# Patient Record
Sex: FEMALE | Race: WHITE | NOT HISPANIC OR LATINO | Employment: STUDENT | URBAN - METROPOLITAN AREA
[De-identification: names, ages, dates, MRNs, and addresses within clinical notes are randomized per-mention and may not be internally consistent; named-entity substitution may affect disease eponyms.]

---
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
## 2019-02-28 DIAGNOSIS — Q90.9 DOWN SYNDROME: Primary | ICD-10-CM

## 2019-04-04 ENCOUNTER — OFFICE VISIT (OUTPATIENT)
Dept: FAMILY MEDICINE CLINIC | Facility: CLINIC | Age: 19
End: 2019-04-04

## 2019-04-04 VITALS
BODY MASS INDEX: 27.77 KG/M2 | SYSTOLIC BLOOD PRESSURE: 98 MMHG | DIASTOLIC BLOOD PRESSURE: 72 MMHG | HEIGHT: 55 IN | HEART RATE: 68 BPM | OXYGEN SATURATION: 98 % | WEIGHT: 120 LBS

## 2019-04-04 DIAGNOSIS — G47.30 SLEEP APNEA, UNSPECIFIED TYPE: ICD-10-CM

## 2019-04-04 DIAGNOSIS — E03.1 CONGENITAL HYPOTHYROIDISM WITHOUT GOITER: ICD-10-CM

## 2019-04-04 DIAGNOSIS — Z87.74 H/O CONGENITAL ATRIAL SEPTAL DEFECT (ASD) REPAIR: ICD-10-CM

## 2019-04-04 DIAGNOSIS — Z00.00 PHYSICAL EXAM: Primary | ICD-10-CM

## 2019-04-04 DIAGNOSIS — Q90.9 DOWN SYNDROME: ICD-10-CM

## 2019-04-04 PROCEDURE — 99395 PREV VISIT EST AGE 18-39: CPT | Performed by: NURSE PRACTITIONER

## 2019-04-04 PROCEDURE — 99212 OFFICE O/P EST SF 10 MIN: CPT | Performed by: NURSE PRACTITIONER

## 2019-04-04 RX ORDER — ASCORBIC ACID 500 MG
1000 TABLET ORAL DAILY
COMMUNITY

## 2019-04-04 RX ORDER — CHLORAL HYDRATE 500 MG
CAPSULE ORAL
COMMUNITY
End: 2020-05-19

## 2019-04-04 NOTE — PROGRESS NOTES
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 She states she is doing well and has no concerns  PMHX: Down syndrome is followed by ortho, cards, endo.,ent,and gyn. Keeps up with appointment mother is very involved in her care.  Recent weight loss of 6 lbs and is going to be joining the gym.      REVIEW OF SYSTEMS  Constitutional: Negative.    HENT: Negative.    Eyes: Negative.    Respiratory: Negative.    Cardiovascular: Negative.    Gastrointestinal: Negative.    Endocrine: Negative.    Genitourinary: Negative.    Musculoskeletal: Negative.  Skin: Negative.    Allergic/Immunologic: Negative.    Neurological: Negative.    Hematological: Negative.    Psychiatric/Behavioral: Negative.    All other systems reviewed and are negative.          PHYSICAL EXAM    Vitals:    04/04/19 1333   BP: 98/72   Pulse: 68   SpO2: 98%   Weight: 54.4 kg (120 lb)   Height: 138.4 cm (54.5\")     GENERAL: alert and oriented, afebrile and vital signs stable  HEENT: oral mucosa moist, PEERLA, EOM, conjunctiva normal  No cervical adenopathy  LUNGS: clear to ascultation bilaterally, no rales, ronchi or wheezing  HEART: RRR S1 S2 without murmers, thrills, rubs or gallops  CHEST WALL: within normal limits, no tenderness  BREAST EXAM: No masses, skin changes, tenderness or discharge noted  ABDOMEN: WNL. Normal BS.  EXTREMITIES: No clubbing, cyanosis or edema noted. Normal Pulses.  SKIN: warm, dry, no rashes noted  NEURO: CN II- XII grossly intact    ASSESSMENT AND PLAN  Problem List Items Addressed This Visit        Other    Down syndrome      Other Visit Diagnoses     Physical exam    -  Primary    Congenital hypothyroidism without goiter        H/O congenital atrial septal defect (ASD) repair        Sleep apnea, unspecified type          I am deferring labs at this time she is scheduled to have labs with Dr" Du in a couple of weeks and he will send us her results.  Routine health maintenance reviewed and discussed with Chantale.    .No orders of the defined types were placed in this encounter.    No Follow-up on file.

## 2019-04-04 NOTE — PATIENT INSTRUCTIONS
Preventive Care 18-39 Years, Female  Preventive care refers to lifestyle choices and visits with your health care provider that can promote health and wellness.  What does preventive care include?  · A yearly physical exam. This is also called an annual well check.  · Dental exams once or twice a year.  · Routine eye exams. Ask your health care provider how often you should have your eyes checked.  · Personal lifestyle choices, including:  ? Daily care of your teeth and gums.  ? Regular physical activity.  ? Eating a healthy diet.  ? Avoiding tobacco and drug use.  ? Limiting alcohol use.  ? Practicing safe sex.  ? Taking vitamin and mineral supplements as recommended by your health care provider.  What happens during an annual well check?  The services and screenings done by your health care provider during your annual well check will depend on your age, overall health, lifestyle risk factors, and family history of disease.  Counseling  Your health care provider may ask you questions about your:  · Alcohol use.  · Tobacco use.  · Drug use.  · Emotional well-being.  · Home and relationship well-being.  · Sexual activity.  · Eating habits.  · Work and work environment.  · Method of birth control.  · Menstrual cycle.  · Pregnancy history.    Screening  You may have the following tests or measurements:  · Height, weight, and BMI.  · Diabetes screening. This is done by checking your blood sugar (glucose) after you have not eaten for a while (fasting).  · Blood pressure.  · Lipid and cholesterol levels. These may be checked every 5 years starting at age 20.  · Skin check.  · Hepatitis C blood test.  · Hepatitis B blood test.  · Sexually transmitted disease (STD) testing.  · BRCA-related cancer screening. This may be done if you have a family history of breast, ovarian, tubal, or peritoneal cancers.  · Pelvic exam and Pap test. This may be done every 3 years starting at age 21. Starting at age 30, this may be done every 5  years if you have a Pap test in combination with an HPV test.    Discuss your test results, treatment options, and if necessary, the need for more tests with your health care provider.  Vaccines  Your health care provider may recommend certain vaccines, such as:  · Influenza vaccine. This is recommended every year.  · Tetanus, diphtheria, and acellular pertussis (Tdap, Td) vaccine. You may need a Td booster every 10 years.  · Varicella vaccine. You may need this if you have not been vaccinated.  · HPV vaccine. If you are 26 or younger, you may need three doses over 6 months.  · Measles, mumps, and rubella (MMR) vaccine. You may need at least one dose of MMR. You may also need a second dose.  · Pneumococcal 13-valent conjugate (PCV13) vaccine. You may need this if you have certain conditions and were not previously vaccinated.  · Pneumococcal polysaccharide (PPSV23) vaccine. You may need one or two doses if you smoke cigarettes or if you have certain conditions.  · Meningococcal vaccine. One dose is recommended if you are age 19-21 years and a first-year college student living in a residence [place], or if you have one of several medical conditions. You may also need additional booster doses.  · Hepatitis A vaccine. You may need this if you have certain conditions or if you travel or work in places where you may be exposed to hepatitis A.  · Hepatitis B vaccine. You may need this if you have certain conditions or if you travel or work in places where you may be exposed to hepatitis B.  · Haemophilus influenzae type b (Hib) vaccine. You may need this if you have certain risk factors.    Talk to your health care provider about which screenings and vaccines you need and how often you need them.  This information is not intended to replace advice given to you by your health care provider. Make sure you discuss any questions you have with your health care provider.  Document Released: 02/13/2003 Document Revised: 07/31/2018  Document Reviewed: 10/18/2016  [employer] Interactive Patient Education © 2019 Elsevier Inc.

## 2019-08-07 ENCOUNTER — OFFICE VISIT (OUTPATIENT)
Dept: FAMILY MEDICINE CLINIC | Facility: CLINIC | Age: 19
End: 2019-08-07

## 2019-08-07 VITALS
DIASTOLIC BLOOD PRESSURE: 72 MMHG | RESPIRATION RATE: 16 BRPM | WEIGHT: 122 LBS | BODY MASS INDEX: 28.23 KG/M2 | HEIGHT: 55 IN | SYSTOLIC BLOOD PRESSURE: 96 MMHG | OXYGEN SATURATION: 99 % | HEART RATE: 77 BPM

## 2019-08-07 DIAGNOSIS — L01.00 IMPETIGO: Primary | ICD-10-CM

## 2019-08-07 PROCEDURE — 99213 OFFICE O/P EST LOW 20 MIN: CPT | Performed by: NURSE PRACTITIONER

## 2019-08-07 RX ORDER — CETIRIZINE HYDROCHLORIDE 10 MG/1
10 TABLET ORAL DAILY
Refills: 0 | COMMUNITY
Start: 2019-07-15 | End: 2020-06-01 | Stop reason: SDUPTHER

## 2019-08-07 RX ORDER — MONTELUKAST SODIUM 10 MG/1
10 TABLET ORAL DAILY
Refills: 11 | COMMUNITY
Start: 2019-07-07

## 2019-08-07 RX ORDER — OLOPATADINE HYDROCHLORIDE 2 MG/ML
1 SOLUTION/ DROPS OPHTHALMIC DAILY
COMMUNITY
End: 2021-05-20

## 2019-08-07 RX ORDER — PIMECROLIMUS 10 MG/G
CREAM TOPICAL 2 TIMES DAILY
COMMUNITY
End: 2021-05-20

## 2019-08-07 RX ORDER — MUPIROCIN CALCIUM 20 MG/G
CREAM TOPICAL 3 TIMES DAILY
Qty: 15 G | Refills: 0 | Status: SHIPPED | OUTPATIENT
Start: 2019-08-07 | End: 2020-05-19

## 2019-08-07 RX ORDER — POLYETHYLENE GLYCOL 3350 17 G/DOSE
POWDER (GRAM) ORAL
Refills: 5 | COMMUNITY
Start: 2019-07-09 | End: 2021-12-08 | Stop reason: SDUPTHER

## 2019-08-07 RX ORDER — MELATONIN 5 MG
5 TABLET,CHEWABLE ORAL DAILY
COMMUNITY
End: 2021-05-20

## 2019-08-07 NOTE — PROGRESS NOTES
Subjective   Chantale Reynolds is a 19 y.o. female.     History of Present Illness   Pt is here for rash. Sx include rash in both forearms, specially in R arm. Pt states Sx have been present for about 2 months. Pt says has used hydrocortisone and  Elidel  OTC                                     The following portions of the patient's history were reviewed and updated as appropriate: allergies, current medications, past family history, past medical history, past social history, past surgical history and problem list.    Review of Systems   Constitutional: Negative for activity change, appetite change and fever.   Respiratory: Negative for cough and shortness of breath.    Skin: Positive for rash.       Objective   Physical Exam   Constitutional: She appears well-developed and well-nourished. No distress.   HENT:   Head: Normocephalic and atraumatic.   Eyes: EOM are normal.   Neck: Neck supple. No thyromegaly present.   Musculoskeletal: Normal range of motion.   Neurological: She is alert.   Skin: Skin is warm. Rash noted.   Diffuse macular papular lesions to rt forearm nonvesicular.   Nursing note and vitals reviewed.      Assessment/Plan   Chantale was seen today for rash.    Diagnoses and all orders for this visit:    Impetigo    Other orders  -     mupirocin (BACTROBAN) 2 % cream; Apply  topically to the appropriate area as directed 3 (Three) Times a Day.      If no improvement with the Bactroban they are going to f/u with her dermatologist Dr Blank

## 2019-08-07 NOTE — PATIENT INSTRUCTIONS
Rash, Adult  A rash is a change in the color of the skin. A rash can also change the way your skin feels. There are many different conditions and factors that can cause a rash. Some rashes may disappear after a few days, but some may last for a couple of weeks. Common causes of rashes include:  · Viral infections, such as:  ? Colds.  ? Measles.  ? Hand, foot, and mouth disease.  · Bacterial infections, such as:  ? Scarlet fever.  ? Impetigo.  · Fungal infections, such as Candida.  · Allergic reactions to food, medicines, or skin care products.  Follow these instructions at home:  The goal of treatment is to stop the itching and keep the rash from spreading. Pay attention to any changes in your symptoms. Follow these instructions to help with your condition:  Medicine  Take or apply over-the-counter and prescription medicines only as told by your health care provider. These may include:  · Corticosteroid cream.  · Anti-itch lotions.  · Oral antihistamines.  Skin Care  · Apply cool compresses to the affected areas.  · Try taking a bath with:  ? Epsom salts. Follow the instructions on the packaging. You can get these at your local pharmacy or grocery store.  ? Baking soda. Pour a small amount into the bath as told by your health care provider.  ? Colloidal oatmeal. Follow the instructions on the packaging. You can get this at your local pharmacy or grocery store.  · Try applying baking soda paste to your skin. Stir water into baking soda until it reaches a paste-like consistency.  · Do not scratch or rub your skin.  · Avoid covering the rash. Make sure the rash is exposed to air as much as possible.  General instructions  · Avoid hot showers or baths, which can make itching worse. A cold shower may help.  · Avoid scented soaps, detergents, and perfumes. Use gentle soaps, detergents, perfumes, and other cosmetic products.  · Avoid any substance that causes your rash. Keep a journal to help track what causes your rash.  "Write down:  ? What you eat.  ? What cosmetic products you use.  ? What you drink.  ? What you wear. This includes jewelry.  · Keep all follow-up visits as told by your health care provider. This is important.  Contact a health care provider if:  · You sweat at night.  · You lose weight.  · You urinate more than normal.  · You feel weak.  · You vomit.  · Your skin or the whites of your eyes look yellow (jaundice).  · Your skin:  ? Tingles.  ? Is numb.  · Your rash:  ? Does not go away after several days.  ? Gets worse.  · You are:  ? Unusually thirsty.  ? More tired than normal.  · You have:  ? New symptoms.  ? Pain in your abdomen.  ? A fever.  ? Diarrhea.  Get help right away if:  · You develop a rash that covers all or most of your body. The rash may or may not be painful.  · You develop blisters that:  ? Are on top of the rash.  ? Grow larger or grow together.  ? Are painful.  ? Are inside your nose or mouth.  · You develop a rash that:  ? Looks like purple pinprick-sized spots all over your body.  ? Has a \"bull's eye\" or looks like a target.  ? Is not related to sun exposure, is red and painful, and causes your skin to peel.  Summary  · Some rashes disappear after a few days, but some may last for a couple of weeks.  · The goal of treatment is to stop the itching and keep the rash from spreading.  · Take or apply over-the-counter and prescription medicines only as told by your health care provider.  This information is not intended to replace advice given to you by your health care provider. Make sure you discuss any questions you have with your health care provider.  Document Released: 12/08/2003 Document Revised: 01/08/2019 Document Reviewed: 05/04/2016  Elsevier Interactive Patient Education © 2019 Else[employer] Inc.    "

## 2019-09-24 ENCOUNTER — OFFICE VISIT (OUTPATIENT)
Dept: OBSTETRICS AND GYNECOLOGY | Age: 19
End: 2019-09-24

## 2019-09-24 VITALS
WEIGHT: 123 LBS | SYSTOLIC BLOOD PRESSURE: 120 MMHG | HEIGHT: 55 IN | BODY MASS INDEX: 28.46 KG/M2 | DIASTOLIC BLOOD PRESSURE: 68 MMHG

## 2019-09-24 DIAGNOSIS — N89.6 INTACT HYMENAL RING: ICD-10-CM

## 2019-09-24 DIAGNOSIS — Z00.00 ENCOUNTER FOR ANNUAL PHYSICAL EXAM: Primary | ICD-10-CM

## 2019-09-24 PROCEDURE — 99395 PREV VISIT EST AGE 18-39: CPT | Performed by: OBSTETRICS & GYNECOLOGY

## 2019-09-24 NOTE — PROGRESS NOTES
Subjective     Chief Complaint   Patient presents with   • Gynecologic Exam     Annual         History of Present Illness      Chanatle Reynolds is a very pleasant  19 y.o. female who presents for annual exam.  Mammo Exam none, Contraception patient's had a hysterectomy, Exercise patient is walking with her mom 7 times a week    Patient underwent a hysterectomy.  She and her mother are very happy they have had that.  IVC she is had no bleeding no dysmenorrhea.    She was known to have an intact hymen , and we have discussed how important it is for the family to be on alert for sexual predators given patient's history of Down syndrome..    Patient occasionally will have some gastrointestinal symptoms that she is addressing with her primary care physician but does not have any gynecological concerns or complaints.    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          Obstetric History:  OB History     No data available         Menstrual History:     Patient's last menstrual period was 05/19/2016.       Sexual History:       Past Medical History:   Diagnosis Date   • Acid reflux    • ASD (atrial septal defect)    • Asthma     MILD   • AVD (aortic valve disease)    • Congestive heart failure (CMS/HCC)    • Down syndrome    • Dysmenorrhea    • Irregular heart beat    • Knee pain     PAULY   • Pain in both feet    • Sleep apnea     WEARS CPAP   • Thyroid disease    • Urinary incontinence    • Uses hearing aid      Past Surgical History:   Procedure Laterality Date   • CARDIAC VALVE SURGERY     • EAR GRAFT BONE Left    • HYSTERECTOMY SUPRACERVICAL N/A 6/1/2016    Procedure: LAPAROSCOPIC ASSISTED SUPRACERVICAL HYSTERECTOMY WITH BILATERAL SALPINGECTOMY;  Surgeon: Howie Faria MD;  [place] [facility] [place] [facility] [place]  Service:    • TONSILLECTOMY     • TONSILLECTOMY AND ADENOIDECTOMY     • TYMPANOPLASTY W/ MASTOIDECTOMY      X3       SOCIAL  "Hx:      The following portions of the patient's history were reviewed and updated as appropriate: allergies, current medications, past family history, past medical history, past social history, past surgical history and problem list.    Review of Systems        Except as outlined in history of physical illness, patient denies any changes in her GYN, , GI systems.  All other systems reviewed were negative.         Objective   Physical Exam    /68   Ht 138.4 cm (54.5\")   Wt 55.8 kg (123 lb)   LMP 05/19/2016   BMI 29.11 kg/m²     General: Patient is alert and oriented and appears overall healthy  Neck: Is supple without thyromegaly, no carotid bruits and no lymphadenopathy  Lungs: Clear bilaterally, no wheezing, rhonchi, or rales.  Respiratory rate is normal  Breast: Even, symmetrical, no lymphadenopathy, no retraction, no masses or cysts  Heart: Regular rate and rhythm are appreciated, no murmurs or rubs are heard  Abdomen: Is soft, without organomegaly, bowel sounds are positive, there is no                                rebound or guarding and palpation does not produce any discomfort  Back: Nontender without CVA tenderness  Pelvic: External genitalia appear normal and consistent with mature female.  BUS normal                            Vagina is clean dry without discharge and appears adequately estrogenized, no               lesions or masses are present                 Hymen is mostly intact.  Unchanged from previous exam                         Cervix is noninflamed without discharge or lesions.  There is no cervical motion             tenderness.                             Adnexa are non-enlarged, non tender               Rectal exam reveals adequate sphincter tone and no masses or lesions are                     appreciated on digital rectal examination.      Annual Well Woman Exam  Patient Active Problem List   Diagnosis   • Down syndrome   • Injury of index finger   • Knee pain   • ENRIQUE " (obstructive sleep apnea)   • Pain in joint involving pelvic region and thigh   • Patellar subluxation   • Patellofemoral pain syndrome of right knee   • Pes planovalgus   • Intact hymenal ring                 Assessment/Plan   Chantale was seen today for gynecologic exam.    Diagnoses and all orders for this visit:    Encounter for annual physical exam    Intact hymenal ring    It does not appear that there has been any type of sexual activity with intact hymenal ring.  Bimanual exam is normal.  Patient's had a supracervical hysterectomy.  She seems to be doing well hormonally.  Interestingly it does appear that she has a little more breast development than our previous examination.  Mom concurs that she thinks that she has had some hormonal development in the last year.  This would coincide with delayed physiologic development in a patient with Down syndrome.  There is no apparent reason to perform a Pap smear at this stage.  Patient will return in 12 months for regular visit and I have asked mom to call if any of her conditions change.        Discussed today's findings and concerns with patient.  Continue to recommend regular exercise including cardiovascular and resistance training as well as  breast self-exam. Wellness lab, mammography, & pap smear, in accordance with age guidelines.        All of the patient's questions were addressed and answered, I have encouraged her to call for today's test results if she has not received them within 10 days.  Patient is advised to call with any change in her condition or with any other questions, otherwise return in 12 months for annual examination.

## 2019-11-19 ENCOUNTER — TELEPHONE (OUTPATIENT)
Dept: FAMILY MEDICINE CLINIC | Facility: CLINIC | Age: 19
End: 2019-11-19

## 2019-11-19 NOTE — TELEPHONE ENCOUNTER
Patient's mother Micki called, she would like a call back regarding Chantale's labs that were completed at [facility] - Dr. Sandy on 9/30/2019. She stated she would like Deepti's opinion regarding the lab results and possibly retesting the liver labs. For the labs, she would like those to be completed at the [facility].   Please call Micki at [phone]. Per Micki's request- please do not schedule anything without speaking to her.    Thank you.

## 2019-11-20 NOTE — TELEPHONE ENCOUNTER
Norma,  Can you do me a favor and call pediatric endocrine and have them fax her last visit and her labs faxed to us please and thank you.

## 2019-11-26 ENCOUNTER — RESULTS ENCOUNTER (OUTPATIENT)
Dept: FAMILY MEDICINE CLINIC | Facility: CLINIC | Age: 19
End: 2019-11-26

## 2019-11-26 ENCOUNTER — TELEPHONE (OUTPATIENT)
Dept: FAMILY MEDICINE CLINIC | Facility: CLINIC | Age: 19
End: 2019-11-26

## 2019-11-26 DIAGNOSIS — R79.89 ELEVATED LFTS: ICD-10-CM

## 2019-11-26 DIAGNOSIS — R79.89 ELEVATED LFTS: Primary | ICD-10-CM

## 2019-11-26 NOTE — TELEPHONE ENCOUNTER
Spoke with mom about Chantale's recent lab work, that showed she had elevated liver enzymes.  These labs were done by Dr. Nela Estrada a pediatric endocrinologist he reports he thinks that this is related to steatohepatitis and I agree.  In essence I think she has nonalcoholic fatty liver disease.  We are going to repeat labs in mother is requesting that we refer her to GI which we will.

## 2019-11-27 ENCOUNTER — RESULTS ENCOUNTER (OUTPATIENT)
Dept: FAMILY MEDICINE CLINIC | Facility: CLINIC | Age: 19
End: 2019-11-27

## 2019-11-27 DIAGNOSIS — R79.89 ELEVATED LFTS: ICD-10-CM

## 2020-03-23 ENCOUNTER — TELEPHONE (OUTPATIENT)
Dept: FAMILY MEDICINE CLINIC | Facility: CLINIC | Age: 20
End: 2020-03-23

## 2020-03-23 NOTE — TELEPHONE ENCOUNTER
PATIENT'S MOTHER CALLED AND THE PATIENT  HAS BEEN DIAGNOSED WITH A FLUID FILLED CYST ON HER SPINE. SHE HASN'T BEEN ABLE T GET ANYONE TO EXPLAIN TO HER ON HOW TO PROCEED AND WHAT SORT OF ADJUSTMENTS MIKEL SHOULD MAKE.     SHE WOULD LIKE TO KNOW IF MANDO CAN OFFER ANY GUIDANCE OR HELP HER FIGURE OUT WHAT IS GOING ON.     [facility] IS WHERE SHE WAS SEEN.   W/ LEYLA RFEGOSO NP    PATIENT CALLBACK [phone]

## 2020-03-23 NOTE — TELEPHONE ENCOUNTER
PATIENT WENT TO HER ENDOCRINOLOGIST TODAY AND SHE HAD A LOT OF BLOOD WORK ON HER. AND SHE WOULD LIKE MANDO ALEJO TO HAVE THE RESULTS.     CMP   CBC   A1C  LIPID PANEL   FREE T4  TSH    SHE IS ALSO BEEN SEEN BY HER EAR DOCTOR AND HER ORTHO SURGEON.      PATIENT CALLBACK [phone]

## 2020-03-24 NOTE — TELEPHONE ENCOUNTER
Mother called, patient had been seen at [facility] and had a lumbar MRI.  She was called by the center and told that the patient had a fluid-filled cyst on her lumbar spine with no other plan.  Mother has called the office several times and was called back because [facility] by Dr. High she did not note she is supposed to follow-up and send it to Lily Cantrell the nurse practitioner that is in charge of the case currently with no asked Lily to call the mother back.

## 2020-05-19 ENCOUNTER — OFFICE VISIT (OUTPATIENT)
Dept: FAMILY MEDICINE CLINIC | Facility: CLINIC | Age: 20
End: 2020-05-19

## 2020-05-19 VITALS
HEIGHT: 55 IN | SYSTOLIC BLOOD PRESSURE: 120 MMHG | BODY MASS INDEX: 28.05 KG/M2 | TEMPERATURE: 98.3 F | DIASTOLIC BLOOD PRESSURE: 60 MMHG | WEIGHT: 121.2 LBS

## 2020-05-19 DIAGNOSIS — Z00.00 PHYSICAL EXAM: Primary | ICD-10-CM

## 2020-05-19 DIAGNOSIS — Q90.9 DOWN SYNDROME: ICD-10-CM

## 2020-05-19 PROCEDURE — 99212 OFFICE O/P EST SF 10 MIN: CPT | Performed by: NURSE PRACTITIONER

## 2020-05-19 PROCEDURE — 99395 PREV VISIT EST AGE 18-39: CPT | Performed by: NURSE PRACTITIONER

## 2020-05-19 NOTE — PATIENT INSTRUCTIONS
Preventive Care 21-39 Years Old, Female  Preventive care refers to visits with your health care provider and lifestyle choices that can promote health and wellness. This includes:  · A yearly physical exam. This may also be called an annual well check.  · Regular dental visits and eye exams.  · Immunizations.  · Screening for certain conditions.  · Healthy lifestyle choices, such as eating a healthy diet, getting regular exercise, not using drugs or products that contain nicotine and tobacco, and limiting alcohol use.  What can I expect for my preventive care visit?  Physical exam  Your health care provider will check your:  · Height and weight. This may be used to calculate body mass index (BMI), which tells if you are at a healthy weight.  · Heart rate and blood pressure.  · Skin for abnormal spots.  Counseling  Your health care provider may ask you questions about your:  · Alcohol, tobacco, and drug use.  · Emotional well-being.  · Home and relationship well-being.  · Sexual activity.  · Eating habits.  · Work and work environment.  · Method of birth control.  · Menstrual cycle.  · Pregnancy history.  What immunizations do I need?    Influenza (flu) vaccine  · This is recommended every year.  Tetanus, diphtheria, and pertussis (Tdap) vaccine  · You may need a Td booster every 10 years.  Varicella (chickenpox) vaccine  · You may need this if you have not been vaccinated.  Human papillomavirus (HPV) vaccine  · If recommended by your health care provider, you may need three doses over 6 months.  Measles, mumps, and rubella (MMR) vaccine  · You may need at least one dose of MMR. You may also need a second dose.  Meningococcal conjugate (MenACWY) vaccine  · One dose is recommended if you are age 19-21 years and a first-year college student living in a residence [place], or if you have one of several medical conditions. You may also need additional booster doses.  Pneumococcal conjugate (PCV13) vaccine  · You may need  this if you have certain conditions and were not previously vaccinated.  Pneumococcal polysaccharide (PPSV23) vaccine  · You may need one or two doses if you smoke cigarettes or if you have certain conditions.  Hepatitis A vaccine  · You may need this if you have certain conditions or if you travel or work in places where you may be exposed to hepatitis A.  Hepatitis B vaccine  · You may need this if you have certain conditions or if you travel or work in places where you may be exposed to hepatitis B.  Haemophilus influenzae type b (Hib) vaccine  · You may need this if you have certain conditions.  You may receive vaccines as individual doses or as more than one vaccine together in one shot (combination vaccines). Talk with your health care provider about the risks and benefits of combination vaccines.  What tests do I need?    Blood tests  · Lipid and cholesterol levels. These may be checked every 5 years starting at age 20.  · Hepatitis C test.  · Hepatitis B test.  Screening  · Diabetes screening. This is done by checking your blood sugar (glucose) after you have not eaten for a while (fasting).  · Sexually transmitted disease (STD) testing.  · BRCA-related cancer screening. This may be done if you have a family history of breast, ovarian, tubal, or peritoneal cancers.  · Pelvic exam and Pap test. This may be done every 3 years starting at age 21. Starting at age 30, this may be done every 5 years if you have a Pap test in combination with an HPV test.  Talk with your health care provider about your test results, treatment options, and if necessary, the need for more tests.  Follow these instructions at home:  Eating and drinking    · Eat a diet that includes fresh fruits and vegetables, whole grains, lean protein, and low-fat dairy.  · Take vitamin and mineral supplements as recommended by your health care provider.  · Do not drink alcohol if:  ? Your health care provider tells you not to drink.  ? You are  pregnant, may be pregnant, or are planning to become pregnant.  · If you drink alcohol:  ? Limit how much you have to 0-1 drink a day.  ? Be aware of how much alcohol is in your drink. In the [place] one drink equals one 12 oz bottle of beer (355 mL), one 5 oz glass of wine (148 mL), or one 1½ oz glass of hard liquor (44 mL).  Lifestyle  · Take daily care of your teeth and gums.  · Stay active. Exercise for at least 30 minutes on 5 or more days each week.  · Do not use any products that contain nicotine or tobacco, such as cigarettes, e-cigarettes, and chewing tobacco. If you need help quitting, ask your health care provider.  · If you are sexually active, practice safe sex. Use a condom or other form of birth control (contraception) in order to prevent pregnancy and STIs (sexually transmitted infections). If you plan to become pregnant, see your health care provider for a preconception visit.  What's next?  · Visit your health care provider once a year for a well check visit.  · Ask your health care provider how often you should have your eyes and teeth checked.  · Stay up to date on all vaccines.  This information is not intended to replace advice given to you by your health care provider. Make sure you discuss any questions you have with your health care provider.  Document Released: 02/13/2003 Document Revised: 08/29/2019 Document Reviewed: 08/29/2019  [employer] Interactive Patient Education © 2020 Elsevier Inc.

## 2020-05-19 NOTE — PROGRESS NOTES
Subjective Physical exam  Chantale Reynolds is a 20 y.o. female.   Annual Exam    History of Present Illness   Involved in [employer] crew with the Down's syndrome  [employer]  She is being followed by endocrinology, orthopedics, gynecology and otolaryngology.  She continues school at [employer].    She has a job at [employer] as a .  She also works at [employer].      The following portions of the patient's history were reviewed and updated as appropriate: allergies, current medications, past family history, past medical history, past social history, past surgical history and problem list.    Review of Systems   Constitutional: Negative for activity change, appetite change, chills and fever.   HENT: Negative for congestion.         Wears hearing aids   Eyes: Negative for pain.   Respiratory: Negative for cough and shortness of breath.    Cardiovascular: Negative for chest pain and leg swelling.   Gastrointestinal: Negative for nausea and vomiting.   Genitourinary: Negative for difficulty urinating.   Musculoskeletal: Positive for back pain.   Skin: Negative for rash.   Neurological: Negative for dizziness, facial asymmetry and headache.       Objective   Physical Exam   Constitutional: She is oriented to person, place, and time. She appears well-developed and well-nourished.   HENT:   Head: Normocephalic and atraumatic.   Right Ear: External ear normal.   Left Ear: External ear normal.   Mouth/Throat: Oropharynx is clear and moist.   Eyes: Pupils are equal, round, and reactive to light. Conjunctivae and EOM are normal.   Neck: Neck supple.   Cardiovascular: Normal rate and regular rhythm.   Pulmonary/Chest: Effort normal and breath sounds normal. No respiratory distress.   Abdominal: Bowel sounds are normal.   Musculoskeletal: Normal range of motion.   Lymphadenopathy:     She has no cervical adenopathy.   Neurological: She is alert and oriented to person, place, and time.   Skin: Skin is  warm and dry.   Psychiatric: She has a normal mood and affect.   Nursing note and vitals reviewed.  She continues to have intermittent low back pain.  She was sent to the [facility].  They ordered an MRI which showed that she had a cyst to her lower spine.  The mother was never informed on what to do she was just told to give Chantale some ibuprofen if she complained of pain.  I am going to touch base with Dr. Peguero office and see if they can offer any further advice.  I did review the MRI and there is nothing acute.      Assessment/Plan   Problem List Items Addressed This Visit        Other    Down syndrome      Other Visit Diagnoses     Physical exam    -  Primary        We deferred lab work secondary to seeing endocrinology every 3 months.  I have reviewed her recent labs with her and her mother.  We reviewed the current list of medications there are no changes.       Return in about 1 year (around 5/19/2021).

## 2020-05-21 ENCOUNTER — TELEPHONE (OUTPATIENT)
Dept: FAMILY MEDICINE CLINIC | Facility: CLINIC | Age: 20
End: 2020-05-21

## 2020-05-21 DIAGNOSIS — M54.50 LUMBAR PAIN: Primary | ICD-10-CM

## 2020-05-21 RX ORDER — BACLOFEN 10 MG/1
10 TABLET ORAL 3 TIMES DAILY
Qty: 30 TABLET | Refills: 0 | Status: SHIPPED | OUTPATIENT
Start: 2020-05-21 | End: 2020-07-02 | Stop reason: SDUPTHER

## 2020-05-21 NOTE — TELEPHONE ENCOUNTER
Spoke with mom about the results of her MRI and her continued back pain. I do not think that the csyt on her lower back is the cause of the problem after reviewing the communication between the NP at [facility] and Chantale's mom. They did suggest some PT and I am going to try some baclofen to take prn. Mom is good with the plan and will keep

## 2020-06-01 RX ORDER — CETIRIZINE HYDROCHLORIDE 10 MG/1
10 TABLET ORAL DAILY
Qty: 30 TABLET | Refills: 0 | Status: SHIPPED | OUTPATIENT
Start: 2020-06-01 | End: 2020-07-15

## 2020-06-01 NOTE — TELEPHONE ENCOUNTER
PATIENTS MOTHER, MELIDA, CALLED AND STATED THAT THE PATIENT SEES AN ALLERGY DOCTOR BUT HE IS A HEART TRANSPLANT PATIENT SO HE IS NOT IN OFFICE AT THIS TIME. MELIDA WOULD LIKE TO KNOW IF THE PATIENT COULD GET A REFILL ON HER cetirizine (zyrTEC) 10 MG tablet. PLEASE ADVISE.     PHARMACY [place] [facility] [place] [facility] [address] CALL BACK [phone]

## 2020-06-25 ENCOUNTER — TELEPHONE (OUTPATIENT)
Dept: FAMILY MEDICINE CLINIC | Facility: CLINIC | Age: 20
End: 2020-06-25

## 2020-06-25 NOTE — TELEPHONE ENCOUNTER
MELIDA  CALLED IN AND PATIENT WAS EXPOSED TO COVID -19 AND HAD A FEVER OF 99.0 AND HAS A COUGH . MOTHER IS CONCERNED ABOUT PATIENTS OTHER DIAGNOSES AND WOULD LIKE TO KNOW WHAT THE NEXT COURSE OF ACTION IS .  PLEASE CALL MELIDA  [phone]

## 2020-07-01 ENCOUNTER — TELEPHONE (OUTPATIENT)
Dept: FAMILY MEDICINE CLINIC | Facility: CLINIC | Age: 20
End: 2020-07-01

## 2020-07-01 NOTE — TELEPHONE ENCOUNTER
PATIENT MOTHER REQUESTED TO GET A CALL ABOUT ABOUT THE PATIENT BEING EXPOSED TO COVID, MOTHER STATES THAT THE PATIENT WAS NEGATIVE, MOTHER REQUESTED TO KNOW WHAT ELSE COULD BE GOING ON IF IT IS NEGATIVE. PATIENT IS STILL SPIKING A TEMP GOING UP AND DOWN.     BEST CALL BACK  [phone]

## 2020-07-02 ENCOUNTER — OFFICE VISIT (OUTPATIENT)
Dept: FAMILY MEDICINE CLINIC | Facility: CLINIC | Age: 20
End: 2020-07-02

## 2020-07-02 VITALS
HEIGHT: 55 IN | WEIGHT: 118 LBS | DIASTOLIC BLOOD PRESSURE: 78 MMHG | OXYGEN SATURATION: 98 % | RESPIRATION RATE: 16 BRPM | HEART RATE: 69 BPM | TEMPERATURE: 97.6 F | BODY MASS INDEX: 27.31 KG/M2 | SYSTOLIC BLOOD PRESSURE: 116 MMHG

## 2020-07-02 DIAGNOSIS — R05.3 PERSISTENT COUGH: Primary | ICD-10-CM

## 2020-07-02 DIAGNOSIS — S16.1XXA STRAIN OF NECK MUSCLE, INITIAL ENCOUNTER: ICD-10-CM

## 2020-07-02 PROCEDURE — 99213 OFFICE O/P EST LOW 20 MIN: CPT | Performed by: NURSE PRACTITIONER

## 2020-07-02 RX ORDER — BACLOFEN 10 MG/1
10 TABLET ORAL 3 TIMES DAILY
Qty: 30 TABLET | Refills: 0 | Status: SHIPPED | OUTPATIENT
Start: 2020-07-02 | End: 2020-10-09 | Stop reason: SDUPTHER

## 2020-07-02 RX ORDER — AZITHROMYCIN 250 MG/1
TABLET, FILM COATED ORAL
Qty: 6 TABLET | Refills: 0 | Status: SHIPPED | OUTPATIENT
Start: 2020-07-02 | End: 2020-12-16

## 2020-07-02 NOTE — PATIENT INSTRUCTIONS
Cough, Adult  Coughing is a reflex that clears your throat and your airways (respiratory system). Coughing helps to heal and protect your lungs. It is normal to cough occasionally, but a cough that happens with other symptoms or lasts a long time may be a sign of a condition that needs treatment. An acute cough may only last 2-3 weeks, while a chronic cough may last 8 or more weeks.  Coughing is commonly caused by:  · Infection of the respiratory systemby viruses or bacteria.  · Breathing in substances that irritate your lungs.  · Allergies.  · Asthma.  · Mucus that runs down the back of your throat (postnasal drip).  · Smoking.  · Acid backing up from the stomach into the esophagus (gastroesophageal reflux).  · Certain medicines.  · Chronic lung problems.  · Other medical conditions such as heart failure or a blood clot in the lung (pulmonary embolism).  Follow these instructions at home:  Medicines  · Take over-the-counter and prescription medicines only as told by your health care provider.  · Talk with your health care provider before you take a cough suppressant medicine.  Lifestyle    · Avoid cigarette smoke. Do not use any products that contain nicotine or tobacco, such as cigarettes, e-cigarettes, and chewing tobacco. If you need help quitting, ask your health care provider.  · Drink enough fluid to keep your urine pale yellow.  · Avoid caffeine.  · Do not drink alcohol if your health care provider tells you not to drink.  General instructions    · Pay close attention to changes in your cough. Tell your health care provider about them.  · Always cover your mouth when you cough.  · Avoid things that make you cough, such as perfume, candles, cleaning products, or campfire or tobacco smoke.  · If the air is dry, use a cool mist vaporizer or humidifier in your bedroom or your home to help loosen secretions.  · If your cough is worse at night, try to sleep in a semi-upright position.  · Rest as needed.  · Keep  all follow-up visits as told by your health care provider. This is important.  Contact a health care provider if you:  · Have new symptoms.  · Cough up pus.  · Have a cough that does not get better after 2-3 weeks or gets worse.  · Cannot control your cough with cough suppressant medicines and you are losing sleep.  · Have pain that gets worse or pain that is not helped with medicine.  · Have a fever.  · Have unexplained weight loss.  · Have night sweats.  Get help right away if:  · You cough up blood.  · You have difficulty breathing.  · Your heartbeat is very fast.  These symptoms may represent a serious problem that is an emergency. Do not wait to see if the symptoms will go away. Get medical help right away. Call your local emergency services (911 in the [place] Do not drive yourself to the hospital.  Summary  · Coughing is a reflex that clears your throat and your airways. It is normal to cough occasionally, but a cough that happens with other symptoms or lasts a long time may be a sign of a condition that needs treatment.  · Take over-the-counter and prescription medicines only as told by your health care provider.  · Always cover your mouth when you cough.  · Contact a health care provider if you have new symptoms or a cough that does not get better after 2-3 weeks or gets worse.  This information is not intended to replace advice given to you by your health care provider. Make sure you discuss any questions you have with your health care provider.  Document Released: 06/15/2012 Document Revised: 01/06/2020 Document Reviewed: 01/06/2020  Elsevier Patient Education © 2020 Elsevier Inc.

## 2020-07-02 NOTE — PROGRESS NOTES
Subjective intermittent fever  Chantale Reynolds is a 20 y.o. female.   Cough and Fever    History of Present Illness   Has had a cough and an intermittent fever and an exposure to a nephew that had a fever and cough.  She was tested for Covid   Also is having neck pain that she has on occasion. She takes baclofen as needed and does well with speedy recovery.  The following portions of the patient's history were reviewed and updated as appropriate: allergies, current medications, past family history, past medical history, past social history, past surgical history and problem list.    Review of Systems   Constitutional: Positive for activity change and fever. Negative for appetite change and chills.   HENT: Negative for congestion, ear pain, sinus pressure, sneezing, sore throat and swollen glands.    Eyes: Negative for pain.   Respiratory: Positive for cough. Negative for shortness of breath.    Cardiovascular: Negative for chest pain and leg swelling.   Gastrointestinal: Negative for nausea and vomiting.   Genitourinary: Negative for difficulty urinating.   Skin: Negative for rash.   Neurological: Negative for dizziness, facial asymmetry and headache.       Objective   Physical Exam   Constitutional: She is oriented to person, place, and time. She appears well-developed and well-nourished.   HENT:   Head: Normocephalic and atraumatic.   Right Ear: External ear normal.   Left Ear: External ear normal.   Mouth/Throat: Oropharynx is clear and moist.   Eyes: Pupils are equal, round, and reactive to light. Conjunctivae and EOM are normal.   Neck: Neck supple.   Cardiovascular: Normal rate and regular rhythm.   Pulmonary/Chest: Effort normal and breath sounds normal. No respiratory distress.   Abdominal: Bowel sounds are normal.   Musculoskeletal: Normal range of motion.   Decrease extension with neck   Lymphadenopathy:     She has no cervical adenopathy.   Neurological: She is alert and oriented to person, place, and  time.   Skin: Skin is warm and dry.   Psychiatric: She has a normal mood and affect.   Nursing note and vitals reviewed.        Assessment/Plan   Problem List Items Addressed This Visit     None      Visit Diagnoses     Persistent cough    -  Primary    Strain of neck muscle, initial encounter                   Return if symptoms worsen or fail to improve.

## 2020-07-14 ENCOUNTER — TELEPHONE (OUTPATIENT)
Dept: FAMILY MEDICINE CLINIC | Facility: CLINIC | Age: 20
End: 2020-07-14

## 2020-07-14 RX ORDER — DEXTROMETHORPHAN HYDROBROMIDE AND PROMETHAZINE HYDROCHLORIDE 15; 6.25 MG/5ML; MG/5ML
5 SYRUP ORAL NIGHTLY
Qty: 180 ML | Refills: 0 | Status: SHIPPED | OUTPATIENT
Start: 2020-07-14 | End: 2020-12-16

## 2020-07-14 NOTE — TELEPHONE ENCOUNTER
Mom called and reports that she continues with a persistent cough. She had a round of antibiotics which helped  Some. Mom is requesting a cough suppressant.Going to try some Dextromethorphan/ and promethazine.

## 2020-07-15 RX ORDER — CETIRIZINE HYDROCHLORIDE 10 MG/1
TABLET ORAL
Qty: 30 TABLET | Refills: 0 | Status: SHIPPED | OUTPATIENT
Start: 2020-07-15 | End: 2020-08-07

## 2020-08-07 RX ORDER — CETIRIZINE HYDROCHLORIDE 10 MG/1
TABLET ORAL
Qty: 30 TABLET | Refills: 5 | Status: SHIPPED | OUTPATIENT
Start: 2020-08-07

## 2020-09-29 ENCOUNTER — OFFICE VISIT (OUTPATIENT)
Dept: OBSTETRICS AND GYNECOLOGY | Age: 20
End: 2020-09-29

## 2020-09-29 VITALS
BODY MASS INDEX: 27.31 KG/M2 | DIASTOLIC BLOOD PRESSURE: 72 MMHG | WEIGHT: 118 LBS | SYSTOLIC BLOOD PRESSURE: 116 MMHG | HEIGHT: 55 IN

## 2020-09-29 DIAGNOSIS — M54.50 MIDLINE LOW BACK PAIN WITHOUT SCIATICA, UNSPECIFIED CHRONICITY: Primary | ICD-10-CM

## 2020-09-29 DIAGNOSIS — Z90.711 HISTORY OF HYSTERECTOMY, SUPRACERVICAL: ICD-10-CM

## 2020-09-29 DIAGNOSIS — N89.6 INTACT HYMENAL RING: ICD-10-CM

## 2020-09-29 DIAGNOSIS — Z00.00 ENCOUNTER FOR ANNUAL PHYSICAL EXAM: ICD-10-CM

## 2020-09-29 LAB
BILIRUB BLD-MCNC: NEGATIVE MG/DL
CLARITY, POC: CLEAR
COLOR UR: YELLOW
GLUCOSE UR STRIP-MCNC: NEGATIVE MG/DL
KETONES UR QL: NEGATIVE
LEUKOCYTE EST, POC: ABNORMAL
NITRITE UR-MCNC: NEGATIVE MG/ML
PH UR: 6.5 [PH] (ref 5–8)
PROT UR STRIP-MCNC: NEGATIVE MG/DL
RBC # UR STRIP: NEGATIVE /UL
SP GR UR: 1.02 (ref 1–1.03)
UROBILINOGEN UR QL: NORMAL

## 2020-09-29 PROCEDURE — 99395 PREV VISIT EST AGE 18-39: CPT | Performed by: OBSTETRICS & GYNECOLOGY

## 2020-09-29 PROCEDURE — 81002 URINALYSIS NONAUTO W/O SCOPE: CPT | Performed by: OBSTETRICS & GYNECOLOGY

## 2020-09-29 RX ORDER — TOBRAMYCIN AND DEXAMETHASONE 3; 1 MG/ML; MG/ML
SUSPENSION/ DROPS OPHTHALMIC
COMMUNITY

## 2020-09-29 RX ORDER — UBIDECARENONE 75 MG
50 CAPSULE ORAL DAILY
COMMUNITY
End: 2021-05-20

## 2020-09-29 NOTE — PROGRESS NOTES
Subjective     Chief Complaint   Patient presents with   • Gynecologic Exam     Annual:discuss lower back pain         History of Present Illness    Chantale Reynolds is a very pleasant 20 y.o. female with Down syndrome, who presents for annual exam., Mammo Exam none, Exercise 5 times a week.  Patient had a supracervical hysterectomy in the past.  She really has no gynecological concerns or complaints.  She is already seeing some classes at [facility].  She is seeing her family physician for regular labs.  Interestingly she has had some atypical back pain along the erector spinae and a muscle mostly on the right side that has been evaluated by orthopedics, physical therapy, MRI.  According to mom and patient they have not discovered any cause for her discomfort.    Obstetric History:  OB History    No obstetric history on file.        Menstrual History:     Patient's last menstrual period was 05/19/2016.       Sexual History:       Past Medical History:   Diagnosis Date   • Acid reflux    • ASD (atrial septal defect)    • Asthma     MILD   • AVD (aortic valve disease)    • Congestive heart failure (CMS/HCC)    • Down syndrome    • Dysmenorrhea    • Irregular heart beat    • Knee pain     PAULY   • Pain in both feet    • Sleep apnea     WEARS CPAP   • Thyroid disease    • Urinary incontinence    • Uses hearing aid       Past Surgical History:   Procedure Laterality Date   • CARDIAC VALVE SURGERY     • EAR GRAFT BONE Left    • HYSTERECTOMY SUPRACERVICAL N/A 6/1/2016    Procedure: LAPAROSCOPIC ASSISTED SUPRACERVICAL HYSTERECTOMY WITH BILATERAL SALPINGECTOMY;  Surgeon: Howie Faria MD;  [facility] [facility] [place]  Service:    • TONSILLECTOMY     • TONSILLECTOMY AND ADENOIDECTOMY     • TYMPANOPLASTY W/ MASTOIDECTOMY      X3       SOCIAL Hx:      The following portions of the patient's history were reviewed and updated as appropriate: allergies, current medications, past family history, past medical history, past social history,  "past surgical history and problem list.    Review of Systems        Except as outlined in history of physical illness, patient denies any changes in her GYN, , GI systems. All other systems reviewed are negative         Objective   Physical Exam    /72   Ht 138.4 cm (54.5\")   Wt 53.5 kg (118 lb)   LMP 05/19/2016   Breastfeeding No   BMI 27.93 kg/m²     General: Patient is alert and oriented and appears overall healthy  Neck: Is supple without thyromegaly, no carotid bruits and no lymphadenopathy  Lungs: Clear bilaterally, no wheezing, rhonchi, or rales.  Respiratory rate is normal  Breast: Symmetrical, no lymphadenopathy, no masses, no erythema.  Heart: Regular rate and rhythm are appreciated, no murmurs or rubs are heard  Abdomen: Is soft, without organomegaly, bowel sounds are positive, there is no                                rebound or guarding and palpation does not produce any discomfort  Back: Nontender without CVA tenderness  Pelvic: External genitalia appear normal and consistent with mature female.  BUS normal, there is a remnant of a small hymenal ridge                            Vagina is clean dry without discharge and appears adequately estrogenized, no               lesions or masses are present, cervix is nulliparous without discharge                         Adnexa are non-enlarged, non tender               Rectal exam reveals adequate sphincter tone and no masses or lesions are                     appreciated on digital rectal examination.    Annual Well Woman Exam     Patient Active Problem List   Diagnosis   • Down syndrome   • Injury of index finger   • Knee pain   • ENRIQUE (obstructive sleep apnea)   • Pain in joint involving pelvic region and thigh   • Patellar subluxation   • Patellofemoral pain syndrome of right knee   • Pes planovalgus   • Intact hymenal ring   • History of hysterectomy, supracervical               Assessment/Plan   Chantale was seen today for gynecologic " exam.    Diagnoses and all orders for this visit:    Midline low back pain without sciatica, unspecified chronicity  -     POC Urinalysis Dipstick    Encounter for annual physical exam    History of hysterectomy, supracervical    Intact hymenal ring        Discussed today's findings and concerns with patient.  Continue to recommend regular exercise including cardiovascular and resistance training as well as  breast self-exam. Wellness lab, mammography, & pap smear, in accordance with age guidelines.    I have encouraged her to call for today's test results if she has not received them within 10 days.  Patient is advised to call with any change in her condition or with any other questions, otherwise return  for annual examination.

## 2020-10-09 NOTE — TELEPHONE ENCOUNTER
Caller: Micki Reynolds    Relationship: Mother    Best call back number [phone]     Medication needed:   Requested Prescriptions     Pending Prescriptions Disp Refills   • baclofen (LIORESAL) 10 MG tablet 30 tablet 0     Sig: Take 1 tablet by mouth 3 (Three) Times a Day.         What is the patient's preferred pharmacy: [id number] - [place], [place] - [address]  - [phone] [facility] [phone] FX         Patient's mother also would like for patient to get a flu shot, but does not know how much time is needed between flu shot and allergy shot     Please advise     [phone]

## 2020-10-12 RX ORDER — BACLOFEN 10 MG/1
10 TABLET ORAL 3 TIMES DAILY
Qty: 30 TABLET | Refills: 0 | Status: SHIPPED | OUTPATIENT
Start: 2020-10-12 | End: 2020-11-11 | Stop reason: SDUPTHER

## 2020-10-16 ENCOUNTER — FLU SHOT (OUTPATIENT)
Dept: FAMILY MEDICINE CLINIC | Facility: CLINIC | Age: 20
End: 2020-10-16

## 2020-10-16 DIAGNOSIS — Z23 NEED FOR INFLUENZA VACCINATION: ICD-10-CM

## 2020-10-16 PROCEDURE — 90686 IIV4 VACC NO PRSV 0.5 ML IM: CPT | Performed by: FAMILY MEDICINE

## 2020-10-16 PROCEDURE — 90471 IMMUNIZATION ADMIN: CPT | Performed by: FAMILY MEDICINE

## 2020-11-11 ENCOUNTER — TELEPHONE (OUTPATIENT)
Dept: FAMILY MEDICINE CLINIC | Facility: CLINIC | Age: 20
End: 2020-11-11

## 2020-11-11 RX ORDER — BACLOFEN 10 MG/1
10 TABLET ORAL 3 TIMES DAILY
Qty: 30 TABLET | Refills: 3 | Status: SHIPPED | OUTPATIENT
Start: 2020-11-11 | End: 2021-01-07

## 2020-11-11 NOTE — TELEPHONE ENCOUNTER
PATIENT'S MOTHER CALLED REQUESTING A CALL BACK FROM MANDO ALEJO REGARDING THE CONTINUATION OF baclofen (LIORESAL) 10 MG tablet      PLEASE ADVISE: [phone]

## 2020-12-15 ENCOUNTER — LAB (OUTPATIENT)
Dept: FAMILY MEDICINE CLINIC | Facility: CLINIC | Age: 20
End: 2020-12-15

## 2020-12-15 ENCOUNTER — TELEPHONE (OUTPATIENT)
Dept: FAMILY MEDICINE CLINIC | Facility: CLINIC | Age: 20
End: 2020-12-15

## 2020-12-15 DIAGNOSIS — K62.5 RECTAL BLEEDING: Primary | ICD-10-CM

## 2020-12-15 DIAGNOSIS — K62.5 RECTAL BLEEDING: ICD-10-CM

## 2020-12-15 NOTE — TELEPHONE ENCOUNTER
PATIENTS MOTHER STATED PATIENT HAS SEVERE BLEEDING FROM THE RECTUM.  HUB STATED THAT PATIENT NEEDS TO GO TO THE ER, BUT MOTHER REFUSED.  REQUESTS CALL BACK FROM DOCTOR    CALL BACK #: [phone]

## 2020-12-16 ENCOUNTER — TELEPHONE (OUTPATIENT)
Dept: GASTROENTEROLOGY | Facility: CLINIC | Age: 20
End: 2020-12-16

## 2020-12-16 ENCOUNTER — OFFICE VISIT (OUTPATIENT)
Dept: GASTROENTEROLOGY | Facility: CLINIC | Age: 20
End: 2020-12-16

## 2020-12-16 VITALS — BODY MASS INDEX: 26.57 KG/M2 | HEIGHT: 55 IN | WEIGHT: 114.8 LBS

## 2020-12-16 DIAGNOSIS — K60.2 ANAL FISSURE: ICD-10-CM

## 2020-12-16 DIAGNOSIS — K59.04 CHRONIC IDIOPATHIC CONSTIPATION: Primary | ICD-10-CM

## 2020-12-16 DIAGNOSIS — K62.5 RECTAL BLEEDING: ICD-10-CM

## 2020-12-16 LAB
APTT PPP: 26 SEC (ref 24–33)
BASOPHILS # BLD AUTO: 0.1 X10E3/UL (ref 0–0.2)
BASOPHILS NFR BLD AUTO: 1 %
EOSINOPHIL # BLD AUTO: 0 X10E3/UL (ref 0–0.4)
EOSINOPHIL NFR BLD AUTO: 1 %
ERYTHROCYTE [DISTWIDTH] IN BLOOD BY AUTOMATED COUNT: 12.6 % (ref 11.7–15.4)
HCT VFR BLD AUTO: 42.3 % (ref 34–46.6)
HGB BLD-MCNC: 14.4 G/DL (ref 11.1–15.9)
IMM GRANULOCYTES # BLD AUTO: 0 X10E3/UL (ref 0–0.1)
IMM GRANULOCYTES NFR BLD AUTO: 0 %
INR PPP: 1 (ref 0.9–1.2)
LYMPHOCYTES # BLD AUTO: 1.8 X10E3/UL (ref 0.7–3.1)
LYMPHOCYTES NFR BLD AUTO: 29 %
MCH RBC QN AUTO: 32.9 PG (ref 26.6–33)
MCHC RBC AUTO-ENTMCNC: 34 G/DL (ref 31.5–35.7)
MCV RBC AUTO: 97 FL (ref 79–97)
MONOCYTES # BLD AUTO: 0.4 X10E3/UL (ref 0.1–0.9)
MONOCYTES NFR BLD AUTO: 6 %
NEUTROPHILS # BLD AUTO: 3.9 X10E3/UL (ref 1.4–7)
NEUTROPHILS NFR BLD AUTO: 63 %
PLATELET # BLD AUTO: 185 X10E3/UL (ref 150–450)
PROTHROMBIN TIME: 11.1 SEC (ref 9.1–12)
RBC # BLD AUTO: 4.38 X10E6/UL (ref 3.77–5.28)
WBC # BLD AUTO: 6.2 X10E3/UL (ref 3.4–10.8)

## 2020-12-16 PROCEDURE — 99203 OFFICE O/P NEW LOW 30 MIN: CPT | Performed by: INTERNAL MEDICINE

## 2020-12-16 RX ORDER — MONTELUKAST SODIUM 10 MG/1
TABLET ORAL
COMMUNITY
End: 2021-01-06

## 2020-12-16 NOTE — TELEPHONE ENCOUNTER
Returned phone call to patient's mother. She states the patient is on Medicaid and will pay for her OTC medications. Advised will prescribe Colace 100 mg one tablet by mouth daily.   Colace 100 mg one capsule daily #90 3 refills called to patient's pharmacy.

## 2020-12-16 NOTE — TELEPHONE ENCOUNTER
----- Message from Shagufta Louis sent at 12/16/2020  9:06 AM EST -----  Regarding: MEDICATION  Pt's mother called pt was seen this morning and She understood that Dr. Esposito was going to start her daughter on a stool softener.  She took this to mean he was sending in a prescription to her pharmacy [facility].  She was calling from the pharmacy and they have no prescription please call her with specifics dosing instructions ect per her request [phone].

## 2020-12-16 NOTE — PROGRESS NOTES
Chief Complaint   Patient presents with   • Constipation   • Black or Bloody Stool     Subjective   HPI  Chantale Reynolds is a 20 y.o. female who presents for new patient evaluation.    She has hx of Down's syndrome.  Highly functioning.  Accompanied by her mother.     She has had onset of rectal bleeding last 4 days.  Patient typically has a bowel movement every day in the evening.  She requires cleaning by family members and they have noticed the last 4 evenings that the patient has had blood in the toilet and on the tissue with wiping after bowel movement.  Patient wears a brief at night while sleeping they have not noticed any blood in the brief upon waking in the mornings.  The patient does have a history of chronic constipation since childhood which is managed with MiraLAX and fiber supplement.  Mother reports that her bowel movements are typically well formed but quite large and the patient often strains during bowel movements.  She had recent CBC performed by PCP yesterday which was normal.     Past Medical History:   Diagnosis Date   • Acid reflux    • ASD (atrial septal defect)    • Asthma     MILD   • AVD (aortic valve disease)    • Congestive heart failure (CMS/HCC)    • Down syndrome    • Dysmenorrhea    • Irregular heart beat    • Knee pain     PAULY   • Pain in both feet    • Sleep apnea     WEARS CPAP   • Thyroid disease    • Urinary incontinence    • Uses hearing aid        Current Outpatient Medications:   •  ALLERGY SERUM INJECTION, Inject  under the skin 1 (One) Time., Disp: , Rfl:   •  ASTEPRO 0.15 % solution nasal spray, , Disp: , Rfl: 1  •  B Complex Vitamins (VITAMIN B COMPLEX PO), Take  by mouth., Disp: , Rfl:   •  baclofen (LIORESAL) 10 MG tablet, Take 1 tablet by mouth 3 (Three) Times a Day. (Patient taking differently: Take 10 mg by mouth Daily.), Disp: 30 tablet, Rfl: 3  •  cetirizine (zyrTEC) 10 MG tablet, TAKE 1 TABLET BY MOUTH EVERY DAY, Disp: 30 tablet, Rfl: 5  •  Cholecalciferol  (VITAMIN D) 2000 UNITS tablet, Take 2,000 Units by mouth daily., Disp: , Rfl:   •  CINNAMON PO, Take  by mouth., Disp: , Rfl:   •  CRANBERRY EXTRACT PO, Take  by mouth., Disp: , Rfl:   •  [employer] PURELAX powder, TAKE 17GM(1 CAPFUL) DISSOLVED IN 8 OUNCES OF WATER BY MOUTH ONCE DAILY, Disp: , Rfl: 5  •  Emollient (AQUAPHOR ADVANCED THERAPY EX), Apply  topically., Disp: , Rfl:   •  FIBER, GUAR GUM, PO, Take  by mouth., Disp: , Rfl:   •  Flaxseed, Linseed, (FLAX SEED OIL PO), Take  by mouth., Disp: , Rfl:   •  levothyroxine (SYNTHROID, LEVOTHROID) 88 MCG tablet, Take 88 mcg by mouth daily., Disp: , Rfl:   •  Melatonin 5 MG chewable tablet, Chew 5 mg Daily., Disp: , Rfl:   •  montelukast (SINGULAIR) 10 MG tablet, Take 10 mg by mouth Daily., Disp: , Rfl: 11  •  montelukast (Singulair) 10 MG tablet, Take  by mouth., Disp: , Rfl:   •  olopatadine (PATADAY) 0.2 % solution ophthalmic solution, Apply 1 drop to eye(s) as directed by provider Daily., Disp: , Rfl:   •  pimecrolimus (ELIDEL) 1 % cream, Apply  topically to the appropriate area as directed 2 (Two) Times a Day., Disp: , Rfl:   •  Probiotic Product (PROBIOTIC-10 PO), Take  by mouth., Disp: , Rfl:   •  tobramycin-dexamethasone (TobraDex) 0.3-0.1 % ophthalmic suspension, , Disp: , Rfl:   •  vitamin B-12 (CYANOCOBALAMIN) 100 MCG tablet, Take 50 mcg by mouth Daily., Disp: , Rfl:   •  vitamin C (ASCORBIC ACID) 500 MG tablet, Take 1,000 mg by mouth Daily., Disp: , Rfl:   Allergies   Allergen Reactions   • Latex Other (See Comments)     Social History     Socioeconomic History   • Marital status: Single     Spouse name: Not on file   • Number of children: Not on file   • Years of education: Not on file   • Highest education level: Not on file   Tobacco Use   • Smoking status: Never Smoker   • Smokeless tobacco: Never Used   Substance and Sexual Activity   • Alcohol use: No   • Drug use: No   • Sexual activity: Defer     No family history on file.  Review of Systems    Constitutional: Negative.    Gastrointestinal: Positive for anal bleeding.   Genitourinary: Negative.      Objective   There were no vitals filed for this visit.  Physical Exam  Vitals signs and nursing note reviewed.   Constitutional:       Appearance: She is well-developed.   HENT:      Head: Normocephalic.   Eyes:      Pupils: Pupils are equal, round, and reactive to light.   Abdominal:      General: Bowel sounds are normal. There is no distension or abdominal bruit.      Palpations: Abdomen is soft. Abdomen is not rigid. There is no shifting dullness, fluid wave or mass.      Tenderness: There is no abdominal tenderness. There is no guarding. Negative signs include Qureshi's sign.      Hernia: No hernia is present. There is no hernia in the ventral area.   Genitourinary:     Rectum: No mass, tenderness, anal fissure, external hemorrhoid or internal hemorrhoid.      Comments: Small external hemorroid with adjacent superficial anal fissure at 9 o'clock position.  No blood or palpable masses on FRANK.  Musculoskeletal: Normal range of motion.   Skin:     General: Skin is dry.   Neurological:      Mental Status: She is alert.   Psychiatric:         Behavior: Behavior normal.         Thought Content: Thought content normal.         Judgment: Judgment normal.       Assessment/Plan   Assessment:     1. Chronic idiopathic constipation    2. Rectal bleeding    3. Anal fissure      Plan:   Suspect anal fissure secondary to straining/constipation as cause of rectal bleeding.  CBC is normal.  Recommend 2 weeks of topical steroids, samples provided.  Recommend starting daily stool softener as well, and continuing current bowel regimen otherwise  Follow up 4 weeks        Carlos Esposito M.D.  [facility]  [address] [place] [address]  Office: [phone]

## 2020-12-16 NOTE — TELEPHONE ENCOUNTER
Mary Hernandez, Navdeep Rep  [facility] [address] [place]   Phone Number: [phone]             Pt mother Julio César had daughter in here this morning, went straight to pharmacy to  what she thought was electronically a script for stool softener but pharmacy didn't have anything. She is wanting to know what is going on .

## 2021-01-06 ENCOUNTER — TELEMEDICINE (OUTPATIENT)
Dept: GASTROENTEROLOGY | Facility: CLINIC | Age: 21
End: 2021-01-06

## 2021-01-06 VITALS — WEIGHT: 112 LBS | BODY MASS INDEX: 25.92 KG/M2 | HEIGHT: 55 IN

## 2021-01-06 DIAGNOSIS — K59.00 CONSTIPATION, UNSPECIFIED CONSTIPATION TYPE: Primary | ICD-10-CM

## 2021-01-06 DIAGNOSIS — K60.2 ANAL FISSURE: ICD-10-CM

## 2021-01-06 PROCEDURE — 99213 OFFICE O/P EST LOW 20 MIN: CPT | Performed by: INTERNAL MEDICINE

## 2021-01-06 NOTE — PROGRESS NOTES
Chief Complaint   Patient presents with   • Anal Fissure     Subjective     HPI  Chantale Reynolds is a 20 y.o. female who presents today for office follow up.     This encounter was performed today via telephone due to the current COVID-19 pandemic.  The patient did give consent for this telephone encounter.    Chantale is doing very well on her current bowel regimen.  She is having softer stool with less straining.  Mom reports she has not seen any blood in brief, she has noticed resolution of anal fissure and decrease in size of external hemorrhoid.      Objective   There were no vitals filed for this visit.    Physical Exam           Assessment/Plan   Assessment:     1. Constipation, unspecified constipation type    2. Anal fissure      Plan:   Recommend continuation of current bowel regimen.  Colonoscopy could be considered if there is recurrence of symptoms, but at this time risk would seem to outweigh benefit.  RTC as needed    I spent 30 minutes caring for Chantale on this date of service. This time includes time spent by me in the following activities:preparing for the visit, counseling and educating the patient/family/caregiver and documenting information in the medical record         Carlos Esposito M.D.  [facility]  [address] [place] [address]  Office: [phone]

## 2021-01-07 RX ORDER — BACLOFEN 10 MG/1
TABLET ORAL
Qty: 90 TABLET | Refills: 1 | Status: SHIPPED | OUTPATIENT
Start: 2021-01-07 | End: 2021-02-01

## 2021-02-01 RX ORDER — BACLOFEN 10 MG/1
TABLET ORAL
Qty: 90 TABLET | Refills: 0 | Status: SHIPPED | OUTPATIENT
Start: 2021-02-01 | End: 2021-07-19

## 2021-04-16 ENCOUNTER — BULK ORDERING (OUTPATIENT)
Dept: CASE MANAGEMENT | Facility: OTHER | Age: 21
End: 2021-04-16

## 2021-04-16 DIAGNOSIS — Z23 IMMUNIZATION DUE: ICD-10-CM

## 2021-05-20 ENCOUNTER — OFFICE VISIT (OUTPATIENT)
Dept: FAMILY MEDICINE CLINIC | Facility: CLINIC | Age: 21
End: 2021-05-20

## 2021-05-20 VITALS
WEIGHT: 111 LBS | DIASTOLIC BLOOD PRESSURE: 84 MMHG | BODY MASS INDEX: 25.69 KG/M2 | HEART RATE: 64 BPM | RESPIRATION RATE: 14 BRPM | HEIGHT: 55 IN | SYSTOLIC BLOOD PRESSURE: 102 MMHG | OXYGEN SATURATION: 98 %

## 2021-05-20 DIAGNOSIS — E55.9 VITAMIN D DEFICIENCY: ICD-10-CM

## 2021-05-20 DIAGNOSIS — Z00.00 PHYSICAL EXAM: Primary | ICD-10-CM

## 2021-05-20 DIAGNOSIS — Z01.89 ROUTINE LAB DRAW: ICD-10-CM

## 2021-05-20 PROCEDURE — 99395 PREV VISIT EST AGE 18-39: CPT | Performed by: NURSE PRACTITIONER

## 2021-05-20 RX ORDER — PSEUDOEPHEDRINE HCL 30 MG
TABLET ORAL
COMMUNITY
Start: 2020-12-16 | End: 2021-12-03

## 2021-05-20 RX ORDER — AZELASTINE 1 MG/ML
SPRAY, METERED NASAL
COMMUNITY
Start: 2021-03-11 | End: 2022-12-16

## 2021-05-20 RX ORDER — ALBUTEROL SULFATE 2.5 MG/3ML
SOLUTION RESPIRATORY (INHALATION)
COMMUNITY
Start: 2021-04-07 | End: 2022-12-16 | Stop reason: SDUPTHER

## 2021-05-20 RX ORDER — CROMOLYN SODIUM 40 MG/ML
SOLUTION/ DROPS OPHTHALMIC
COMMUNITY
Start: 2021-02-15 | End: 2022-12-16

## 2021-05-20 RX ORDER — DIPHENHYDRAMINE HYDROCHLORIDE 12.5 MG/5ML
LIQUID ORAL
COMMUNITY
Start: 2021-04-15

## 2021-05-20 NOTE — PATIENT INSTRUCTIONS
Preventive Care 21-39 Years Old, Female  Preventive care refers to lifestyle choices and visits with your health care provider that can promote health and wellness. This includes:  · A yearly physical exam. This is also called an annual wellness visit.  · Regular dental and eye exams.  · Immunizations.  · Screening for certain conditions.  · Healthy lifestyle choices, such as:  ? Eating a healthy diet.  ? Getting regular exercise.  ? Not using drugs or products that contain nicotine and tobacco.  ? Limiting alcohol use.  What can I expect for my preventive care visit?  Physical exam  Your health care provider may check your:  · Height and weight. These may be used to calculate your BMI (body mass index). BMI is a measurement that tells if you are at a healthy weight.  · Heart rate and blood pressure.  · Body temperature.  · Skin for abnormal spots.  Counseling  Your health care provider may ask you questions about your:  · Past medical problems.  · Family's medical history.  · Alcohol, tobacco, and drug use.  · Emotional well-being.  · Home life and relationship well-being.  · Sexual activity.  · Diet, exercise, and sleep habits.  · Work and work environment.  · Access to firearms.  · Method of birth control.  · Menstrual cycle.  · Pregnancy history.  What immunizations do I need?    Vaccines are usually given at various ages, according to a schedule. Your health care provider will recommend vaccines for you based on your age, medical history, and lifestyle or other factors, such as travel or where you work.  What tests do I need?    Blood tests  · Lipid and cholesterol levels. These may be checked every 5 years starting at age 20.  · Hepatitis C test.  · Hepatitis B test.  Screening  · Diabetes screening. This is done by checking your blood sugar (glucose) after you have not eaten for a while (fasting).  · STD (sexually transmitted disease) testing, if you are at risk.  · BRCA-related cancer screening. This may be  done if you have a family history of breast, ovarian, tubal, or peritoneal cancers.  · Pelvic exam and Pap test. This may be done every 3 years starting at age 21. Starting at age 30, this may be done every 5 years if you have a Pap test in combination with an HPV test.  Talk with your health care provider about your test results, treatment options, and if necessary, the need for more tests.  Follow these instructions at home:  Eating and drinking    · Eat a healthy diet that includes fresh fruits and vegetables, whole grains, lean protein, and low-fat dairy products.  · Take vitamin and mineral supplements as recommended by your health care provider.  · Do not drink alcohol if:  ? Your health care provider tells you not to drink.  ? You are pregnant, may be pregnant, or are planning to become pregnant.  · If you drink alcohol:  ? Limit how much you have to 0-1 drink a day.  ? Be aware of how much alcohol is in your drink. In the [place] one drink equals one 12 oz bottle of beer (355 mL), one 5 oz glass of wine (148 mL), or one 1½ oz glass of hard liquor (44 mL).  Lifestyle  · Take daily care of your teeth and gums. Brush your teeth every morning and night with fluoride toothpaste. Floss one time each day.  · Stay active. Exercise for at least 30 minutes 5 or more days each week.  · Do not use any products that contain nicotine or tobacco, such as cigarettes, e-cigarettes, and chewing tobacco. If you need help quitting, ask your health care provider.  · Do not use drugs.  · If you are sexually active, practice safe sex. Use a condom or other form of birth control (contraception) in order to prevent pregnancy and STIs (sexually transmitted infections). If you plan to become pregnant, see your health care provider for a pre-conception visit.  · Find healthy ways to cope with stress, such as:  ? Meditation, yoga, or listening to music.  ? Journaling.  ? Talking to a trusted person.  ? Spending time with friends and  family.  Safety  · Always wear your seat belt while driving or riding in a vehicle.  · Do not drive:  ? If you have been drinking alcohol. Do not ride with someone who has been drinking.  ? When you are tired or distracted.  ? While texting.  · Wear a helmet and other protective equipment during sports activities.  · If you have firearms in your house, make sure you follow all gun safety procedures.  · Seek help if you have been physically or sexually abused.  What's next?  · Go to your health care provider once a year for an annual wellness visit.  · Ask your health care provider how often you should have your eyes and teeth checked.  · Stay up to date on all vaccines.  This information is not intended to replace advice given to you by your health care provider. Make sure you discuss any questions you have with your health care provider.  Document Revised: 09/02/2020 Document Reviewed: 08/29/2019  [employer] Patient Education © 2021 [employer]

## 2021-05-20 NOTE — PROGRESS NOTES
Subjective   Chantale Reynolds is a 21 y.o. female.   PMHX:Down Syndrome,wears hearing aids is followed by ENT, cardiology and endocrinology.  PSHX:Reviewed in chart and with mother and pt.  PFHX:reviewed in chart and with pt's mother  Exercise:Has regular exercise,dances a lot  Diet:Well balanced low keto  Sleep hygiene:is restless per mother  Employment status:[employer]  Is fully vaccinated for Covid19  March 31.  History of Present Illness     The following portions of the patient's history were reviewed and updated as appropriate: allergies, current medications, past family history, past medical history, past social history, past surgical history and problem list.    Review of Systems   Constitutional: Negative for activity change, appetite change, chills and fever.   HENT: Negative for congestion.    Eyes: Negative for pain.   Respiratory: Negative for cough and shortness of breath.    Cardiovascular: Negative for chest pain and leg swelling.   Gastrointestinal: Negative for nausea and vomiting.   Genitourinary: Negative for difficulty urinating.   Skin: Negative for rash.   Neurological: Negative for dizziness, facial asymmetry and headache.       Objective   Physical Exam  Vitals and nursing note reviewed.   Constitutional:       General: She is not in acute distress.     Appearance: She is well-developed.   HENT:      Head: Normocephalic and atraumatic.      Right Ear: External ear normal.      Left Ear: External ear normal.   Neck:      Thyroid: No thyromegaly.   Cardiovascular:      Rate and Rhythm: Normal rate and regular rhythm.      Heart sounds: Normal heart sounds.   Pulmonary:      Effort: Pulmonary effort is normal.      Breath sounds: Normal breath sounds.   Musculoskeletal:         General: Normal range of motion.      Cervical back: Neck supple.   Skin:     General: Skin is warm.   Neurological:      Mental Status: She is alert.           Assessment/Plan   Diagnoses and all orders for this  visit:    1. Physical exam (Primary)    2. Routine lab draw  -     Comprehensive Metabolic Panel  -     Lipid Panel With LDL / HDL Ratio  -     Hemoglobin A1c  -     CBC (No Diff)    3. Vitamin D deficiency  -     Vitamin D 25 Hydroxy      Preventative counseling for diet and exercise with patient at visit.  Pt reports that they wear their seatbelt regularly.   Will call with her lab results

## 2021-05-21 LAB
25(OH)D3+25(OH)D2 SERPL-MCNC: 65.5 NG/ML (ref 30–100)
ALBUMIN SERPL-MCNC: 4 G/DL (ref 3.9–5)
ALBUMIN/GLOB SERPL: 1.7 {RATIO} (ref 1.2–2.2)
ALP SERPL-CCNC: 82 IU/L (ref 48–121)
ALT SERPL-CCNC: 13 IU/L (ref 0–32)
AST SERPL-CCNC: 19 IU/L (ref 0–40)
BILIRUB SERPL-MCNC: 0.3 MG/DL (ref 0–1.2)
BUN SERPL-MCNC: 14 MG/DL (ref 6–20)
BUN/CREAT SERPL: 18 (ref 9–23)
CALCIUM SERPL-MCNC: 9.1 MG/DL (ref 8.7–10.2)
CHLORIDE SERPL-SCNC: 103 MMOL/L (ref 96–106)
CHOLEST SERPL-MCNC: 104 MG/DL (ref 100–199)
CO2 SERPL-SCNC: 26 MMOL/L (ref 20–29)
CREAT SERPL-MCNC: 0.78 MG/DL (ref 0.57–1)
ERYTHROCYTE [DISTWIDTH] IN BLOOD BY AUTOMATED COUNT: 13 % (ref 11.7–15.4)
GLOBULIN SER CALC-MCNC: 2.3 G/DL (ref 1.5–4.5)
GLUCOSE SERPL-MCNC: 82 MG/DL (ref 65–99)
HBA1C MFR BLD: 4.9 % (ref 4.8–5.6)
HCT VFR BLD AUTO: 40.7 % (ref 34–46.6)
HDLC SERPL-MCNC: 52 MG/DL
HGB BLD-MCNC: 13.6 G/DL (ref 11.1–15.9)
LDLC SERPL CALC-MCNC: 39 MG/DL (ref 0–99)
LDLC/HDLC SERPL: 0.8 RATIO (ref 0–3.2)
MCH RBC QN AUTO: 33.9 PG (ref 26.6–33)
MCHC RBC AUTO-ENTMCNC: 33.4 G/DL (ref 31.5–35.7)
MCV RBC AUTO: 102 FL (ref 79–97)
PLATELET # BLD AUTO: 155 X10E3/UL (ref 150–450)
POTASSIUM SERPL-SCNC: 4.2 MMOL/L (ref 3.5–5.2)
PROT SERPL-MCNC: 6.3 G/DL (ref 6–8.5)
RBC # BLD AUTO: 4.01 X10E6/UL (ref 3.77–5.28)
SODIUM SERPL-SCNC: 141 MMOL/L (ref 134–144)
TRIGL SERPL-MCNC: 55 MG/DL (ref 0–149)
VLDLC SERPL CALC-MCNC: 13 MG/DL (ref 5–40)
WBC # BLD AUTO: 5.8 X10E3/UL (ref 3.4–10.8)

## 2021-07-16 ENCOUNTER — TELEPHONE (OUTPATIENT)
Dept: OBSTETRICS AND GYNECOLOGY | Age: 21
End: 2021-07-16

## 2021-07-16 NOTE — TELEPHONE ENCOUNTER
Pt needs to r/s appointment on 10/11/21 due to link being out of the office. Pt mom stated that she needs to see dr sahu and it needs to be before his next available which is 2/8/22. Please advise    [phone]

## 2021-07-19 ENCOUNTER — OFFICE VISIT (OUTPATIENT)
Dept: FAMILY MEDICINE CLINIC | Facility: CLINIC | Age: 21
End: 2021-07-19

## 2021-07-19 VITALS
HEIGHT: 55 IN | BODY MASS INDEX: 24.99 KG/M2 | WEIGHT: 108 LBS | SYSTOLIC BLOOD PRESSURE: 102 MMHG | HEART RATE: 71 BPM | DIASTOLIC BLOOD PRESSURE: 72 MMHG | RESPIRATION RATE: 14 BRPM | OXYGEN SATURATION: 98 %

## 2021-07-19 DIAGNOSIS — M25.561 ACUTE PAIN OF RIGHT KNEE: Primary | ICD-10-CM

## 2021-07-19 PROCEDURE — 99213 OFFICE O/P EST LOW 20 MIN: CPT | Performed by: NURSE PRACTITIONER

## 2021-07-19 RX ORDER — TOBRAMYCIN 3 MG/ML
SOLUTION/ DROPS OPHTHALMIC
COMMUNITY
Start: 2021-04-26

## 2021-07-19 NOTE — PROGRESS NOTES
Subjective   Chantale Reynolds is a 21 y.o. female.   right lower leg pain for 1 month    History of Present Illness   Right proximal knee pain for about a month. She is still very active and denies any injuries.She used to see Dr. Hoang with peds orthopedist.     The following portions of the patient's history were reviewed and updated as appropriate: allergies, current medications, past family history, past medical history, past social history, past surgical history and problem list.    Review of Systems   Constitutional: Negative for activity change and appetite change.   Musculoskeletal: Negative for gait problem.        Proximal right knee pain       Objective   Physical Exam  Vitals and nursing note reviewed.   Constitutional:       General: She is not in acute distress.     Appearance: She is well-developed.   HENT:      Head: Normocephalic and atraumatic.      Right Ear: External ear normal.      Left Ear: External ear normal.   Neck:      Thyroid: No thyromegaly.   Cardiovascular:      Rate and Rhythm: Normal rate and regular rhythm.      Heart sounds: Normal heart sounds.   Pulmonary:      Effort: Pulmonary effort is normal.      Breath sounds: Normal breath sounds.   Musculoskeletal:         General: No tenderness. Normal range of motion.      Cervical back: Neck supple.      Comments: Right proximal knee pain with palpation. She has joint laxity.   Skin:     General: Skin is warm.   Neurological:      Mental Status: She is alert.           Assessment/Plan   Problem List Items Addressed This Visit        Musculoskeletal and Injuries    Knee pain - Primary        Will need to follow up with Dr Haroon Munoz in pediatric ortho       No follow-ups on file.

## 2021-07-19 NOTE — PATIENT INSTRUCTIONS
Acute Knee Pain, Adult  Acute knee pain is sudden and may be caused by damage, swelling, or irritation of the muscles and tissues that support your knee. The injury may result from:  · A fall.  · An injury to your knee from twisting motions.  · A hit to the knee.  · Infection.  Acute knee pain may go away on its own with time and rest. If it does not, your health care provider may order tests to find the cause of the pain. These may include:  · Imaging tests, such as an X-ray, MRI, or ultrasound.  · Joint aspiration. In this test, fluid is removed from the knee.  · Arthroscopy. In this test, a lighted tube is inserted into the knee and an image is projected onto a TV screen.  · Biopsy. In this test, a sample of tissue is removed from the body and studied under a microscope.  Follow these instructions at home:  Pay attention to any changes in your symptoms. Take these actions to relieve your pain.  If you have a knee sleeve or brace:    · Wear the sleeve or brace as told by your health care provider. Remove it only as told by your health care provider.  · Loosen the sleeve or brace if your toes tingle, become numb, or turn cold and blue.  · Keep the sleeve or brace clean.  · If the sleeve or brace is not waterproof:  ? Do not let it get wet.  ? Cover it with a watertight covering when you take a bath or shower.  Activity  · Rest your knee.  · Do not do things that cause pain or make pain worse.  · Avoid high-impact activities or exercises, such as running, jumping rope, or doing jumping jacks.  · Work with a physical therapist to make a safe exercise program, as recommended by your health care provider. Do exercises as told by your physical therapist.  Managing pain, stiffness, and swelling    · If directed, put ice on the knee:  ? Put ice in a plastic bag.  ? Place a towel between your skin and the bag.  ? Leave the ice on for 20 minutes, 2-3 times a day.  · If directed, use an elastic bandage to put pressure  (compression) on your injured knee. This may control swelling, give support, and help with discomfort.  General instructions  · Take over-the-counter and prescription medicines only as told by your health care provider.  · Raise (elevate) your knee above the level of your heart when you are sitting or lying down.  · Sleep with a pillow under your knee.  · Do not use any products that contain nicotine or tobacco, such as cigarettes, e-cigarettes, and chewing tobacco. These can delay healing. If you need help quitting, ask your health care provider.  · If you are overweight, work with your health care provider and a dietitian to set a weight-loss goal that is healthy and reasonable for you. Extra weight can put pressure on your knee.  · Keep all follow-up visits as told by your health care provider. This is important.  Contact a health care provider if:  · Your knee pain continues, changes, or gets worse.  · You have a fever along with knee pain.  · Your knee feels warm to the touch.  · Your knee jd or locks up.  Get help right away if:  · Your knee swells, and the swelling becomes worse.  · You cannot move your knee.  · You have severe pain in your knee.  Summary  · Acute knee pain can be caused by a fall, an injury, an infection, or damage, swelling, or irritation of the tissues that support your knee.  · Your health care provider may perform tests to find out the cause of the pain.  · Pay attention to any changes in your symptoms. Relieve your pain with rest, medicines, light activity, and use of ice.  · Get help if your pain continues or becomes worse, your knee swells, or you cannot move your knee.  This information is not intended to replace advice given to you by your health care provider. Make sure you discuss any questions you have with your health care provider.  Document Revised: 05/30/2019 Document Reviewed: 05/30/2019  [employer] Patient Education © 2021 [employer]

## 2021-10-04 ENCOUNTER — OFFICE VISIT (OUTPATIENT)
Dept: OBSTETRICS AND GYNECOLOGY | Age: 21
End: 2021-10-04

## 2021-10-04 VITALS
BODY MASS INDEX: 24.53 KG/M2 | WEIGHT: 106 LBS | DIASTOLIC BLOOD PRESSURE: 66 MMHG | SYSTOLIC BLOOD PRESSURE: 112 MMHG | HEIGHT: 55 IN

## 2021-10-04 DIAGNOSIS — Z90.711 HISTORY OF HYSTERECTOMY, SUPRACERVICAL: Primary | ICD-10-CM

## 2021-10-04 DIAGNOSIS — Z00.00 ENCOUNTER FOR ANNUAL PHYSICAL EXAM: ICD-10-CM

## 2021-10-04 PROCEDURE — 99395 PREV VISIT EST AGE 18-39: CPT | Performed by: OBSTETRICS & GYNECOLOGY

## 2021-10-04 RX ORDER — B-COMPLEX WITH VITAMIN C
TABLET ORAL
COMMUNITY

## 2021-10-04 RX ORDER — OLOPATADINE HYDROCHLORIDE 2 MG/ML
SOLUTION/ DROPS OPHTHALMIC
COMMUNITY
Start: 2021-08-02

## 2021-10-04 NOTE — PROGRESS NOTES
Subjective     Chief Complaint   Patient presents with   • Gynecologic Exam     Annual         History of Present Illness    Chantale Reynolds is a very pleasant 21 y.o. female ., Mammo Exam none, Exercise 4 times a week to 5 times a week  Patient's had a supracervical hysterectomy  She has no gynecological problems or concerns she is not sexually active.  She is accompanied as always by her mother.  She is up-to-date with her cardiology and family physician..      Obstetric History:  OB History    No obstetric history on file.        Menstrual History:     Patient's last menstrual period was 05/19/2016.       Sexual History:       Past Medical History:   Diagnosis Date   • Acid reflux    • Anal fissure    • ASD (atrial septal defect)    • Asthma     MILD   • AVD (aortic valve disease)    • Congestive heart failure (HCC)    • Down syndrome    • Dysmenorrhea    • Irregular heart beat    • Knee pain     PAULY   • Pain in both feet    • Sleep apnea     WEARS CPAP   • Thyroid disease    • Urinary incontinence    • Uses hearing aid       Past Surgical History:   Procedure Laterality Date   • CARDIAC VALVE SURGERY     • EAR GRAFT BONE Left    • HYSTERECTOMY SUPRACERVICAL N/A 6/1/2016    Procedure: LAPAROSCOPIC ASSISTED SUPRACERVICAL HYSTERECTOMY WITH BILATERAL SALPINGECTOMY;  Surgeon: Howie Faria MD;  [place] [facility] [place];  Service:    • TONSILLECTOMY     • TONSILLECTOMY AND ADENOIDECTOMY     • TYMPANOPLASTY W/ MASTOIDECTOMY      X3       SOCIAL Hx:      The following portions of the patient's history were reviewed and updated as appropriate: allergies, current medications, past family history, past medical history, past social history, past surgical history and problem list.    Review of Systems        Except as outlined in history of physical illness, patient denies any changes in her GYN, , GI systems. All other systems reviewed are negative      Urinary incontinence assessment discussed       Objective   Physical  "Exam    /66   Ht 138.4 cm (54.5\")   Wt 48.1 kg (106 lb)   LMP 05/19/2016   Breastfeeding No   BMI 25.09 kg/m²     General: Patient is alert and oriented and appears overall healthy  Neck: Is supple without thyromegaly, no carotid bruits and no lymphadenopathy  Lungs: Clear bilaterally, no wheezing, rhonchi, or rales.  Respiratory rate is normal  Breast: Symmetrical, no lymphadenopathy, no masses, no erythema.  Heart: Regular rate and rhythm are appreciated, no murmurs or rubs are heard  Abdomen: Is soft, without organomegaly, bowel sounds are positive, there is no                                rebound or guarding and palpation does not produce any discomfort  Back: Nontender without CVA tenderness  Pelvic: External genitalia appear normal and consistent with mature female.  BUS normal                  Urethral meatus is normal without discharge masses or tenderness                Urethra is normal without tenderness                Bladder is not enlarged not tender                            Vagina is clean dry without discharge and appears adequately estrogenized, no               lesions or masses are present, cervix is nulliparous without lesions or abnormalities                         Adnexa are non-enlarged, non tender               Rectal digital exam reveals adequate sphincter tone and no masses or lesions are                     appreciated on digital rectal examination.    Annual Well Woman Exam     Patient Active Problem List   Diagnosis   • Down syndrome   • Injury of index finger   • Knee pain   • ENRIQUE (obstructive sleep apnea)   • Pain in joint involving pelvic region and thigh   • Patellar subluxation   • Patellofemoral pain syndrome of right knee   • Pes planovalgus   • Intact hymenal ring   • History of hysterectomy, supracervical               Assessment/Plan   Diagnoses and all orders for this visit:    1. History of hysterectomy, supracervical (Primary)        Discussed today's " findings and concerns with patient.  Continue to recommend regular exercise including cardiovascular and resistance training as well as  breast self-exam. Wellness lab, mammography, & pap smear, in accordance with age guidelines.    I have encouraged her to call for today's test results if she has not received them within 10 days.  Patient is advised to call with any change in her condition or with any other questions, otherwise return  for annual examination.

## 2021-12-03 RX ORDER — DOCUSATE SODIUM 100 MG/1
CAPSULE, LIQUID FILLED ORAL
Qty: 90 CAPSULE | Refills: 3 | Status: SHIPPED | OUTPATIENT
Start: 2021-12-03 | End: 2022-01-17 | Stop reason: SDUPTHER

## 2021-12-08 RX ORDER — POLYETHYLENE GLYCOL 3350 17 G/17G
17 POWDER, FOR SOLUTION ORAL DAILY
Qty: 850 G | Refills: 1 | Status: SHIPPED | OUTPATIENT
Start: 2021-12-08 | End: 2022-03-25

## 2022-01-04 ENCOUNTER — TELEPHONE (OUTPATIENT)
Dept: FAMILY MEDICINE CLINIC | Facility: CLINIC | Age: 22
End: 2022-01-04

## 2022-01-04 NOTE — TELEPHONE ENCOUNTER
Caller: Micki Reynolds    Relationship: Mother    Best call back number: [phone]     What is the best time to reach you: ANYTIME    Who are you requesting to speak with (clinical staff, provider,  specific staff member): MANDO ALEJO    Do you know the name of the person who called:     What was the call regarding: PATIENTS MOTHER CALLING WANTING TO DISCUSS LOWER ABDOMINAL PAIN     Do you require a callback: YES

## 2022-01-05 ENCOUNTER — OFFICE VISIT (OUTPATIENT)
Dept: FAMILY MEDICINE CLINIC | Facility: CLINIC | Age: 22
End: 2022-01-05

## 2022-01-05 VITALS
BODY MASS INDEX: 25.46 KG/M2 | OXYGEN SATURATION: 100 % | HEIGHT: 55 IN | DIASTOLIC BLOOD PRESSURE: 78 MMHG | WEIGHT: 110 LBS | HEART RATE: 59 BPM | SYSTOLIC BLOOD PRESSURE: 98 MMHG

## 2022-01-05 DIAGNOSIS — R10.84 GENERALIZED ABDOMINAL PAIN: ICD-10-CM

## 2022-01-05 DIAGNOSIS — N30.00 ACUTE CYSTITIS WITHOUT HEMATURIA: Primary | ICD-10-CM

## 2022-01-05 PROCEDURE — 99213 OFFICE O/P EST LOW 20 MIN: CPT | Performed by: NURSE PRACTITIONER

## 2022-01-05 RX ORDER — KETOCONAZOLE 20 MG/ML
SHAMPOO TOPICAL
COMMUNITY
Start: 2021-12-03

## 2022-01-05 NOTE — PROGRESS NOTES
Subjective   Chantale Reynolds is a 21 y.o. female.   Abdominal Pain    History of Present Illness   Abdominal pain has been going on for a couple of weeks. She c/o intermittent pain to her upper abdomen at night mostly.She also describes the pain as being located in her bladder. Urine out put is normal  She has h/o constipation and is having nightly bowel movements.  She continues to eat well.    The following portions of the patient's history were reviewed and updated as appropriate: allergies, current medications, past family history, past medical history, past social history, past surgical history and problem list.    Review of Systems   Constitutional: Negative for activity change, appetite change, chills, fatigue and fever.   HENT: Negative for congestion.    Respiratory: Negative for cough.    Gastrointestinal: Positive for abdominal pain. Negative for constipation, diarrhea, nausea and vomiting.   Genitourinary: Positive for urinary incontinence. Negative for difficulty urinating, flank pain, frequency and urgency.       Objective   Physical Exam  Vitals and nursing note reviewed.   Constitutional:       Appearance: She is well-developed.   HENT:      Head: Normocephalic and atraumatic.   Eyes:      Pupils: Pupils are equal, round, and reactive to light.   Pulmonary:      Effort: Pulmonary effort is normal.   Abdominal:      General: Abdomen is flat. Bowel sounds are normal. There is no distension.      Palpations: Abdomen is soft.      Tenderness: There is no abdominal tenderness. There is no guarding.   Musculoskeletal:         General: Normal range of motion.   Skin:     General: Skin is warm and dry.   Neurological:      Mental Status: She is alert and oriented to person, place, and time.           Assessment/Plan   Problem List Items Addressed This Visit     None      Visit Diagnoses     Acute cystitis without hematuria    -  Primary    Relevant Orders    Urine Culture - Urine, Urine, Clean Catch     Generalized abdominal pain            Will order a urine cx.  We discussed the fact that is could be constipation and she is going to increase her every other dose should be a full dose.       Return if symptoms worsen or fail to improve.

## 2022-01-05 NOTE — PATIENT INSTRUCTIONS

## 2022-01-07 LAB
BACTERIA UR CULT: NO GROWTH
BACTERIA UR CULT: NORMAL

## 2022-01-17 RX ORDER — DOCUSATE SODIUM 100 MG/1
100 CAPSULE, LIQUID FILLED ORAL
Qty: 90 CAPSULE | Refills: 3 | Status: SHIPPED | OUTPATIENT
Start: 2022-01-17 | End: 2023-02-16 | Stop reason: SDUPTHER

## 2022-02-10 ENCOUNTER — TELEPHONE (OUTPATIENT)
Dept: FAMILY MEDICINE CLINIC | Facility: CLINIC | Age: 22
End: 2022-02-10

## 2022-02-10 NOTE — TELEPHONE ENCOUNTER
I spoke mom.  Pt is doing great.n  No distress, no shortness of breath,    Mom will continued to monitor

## 2022-02-10 NOTE — TELEPHONE ENCOUNTER
Caller: Micki Reynolds    Relationship: Mother    Best call back number: [phone]*    What is the best time to reach you: ANYTIME    Who are you requesting to speak with (clinical staff, provider,  specific staff member): ADRIAN SNYDER    What was the call regarding: PATIENT'S MOTHER CALLED AND STATED THAT THE PATIENT HAS TESTED POSITIVE FOR COVID AND PATIENT'S BLOOD PRESSURE AT TIMES HAS BEEN IN THE 90'S / 50'S. THE LOWEST WAS 2 DAYS AGO, 76/43.    Do you require a callback: YES

## 2022-03-25 RX ORDER — POLYETHYLENE GLYCOL 3350 17 G/17G
POWDER, FOR SOLUTION ORAL
Qty: 510 G | Refills: 3 | Status: SHIPPED | OUTPATIENT
Start: 2022-03-25 | End: 2022-12-12 | Stop reason: SDUPTHER

## 2022-06-06 ENCOUNTER — OFFICE VISIT (OUTPATIENT)
Dept: FAMILY MEDICINE CLINIC | Facility: CLINIC | Age: 22
End: 2022-06-06

## 2022-06-06 VITALS
RESPIRATION RATE: 14 BRPM | BODY MASS INDEX: 25.92 KG/M2 | SYSTOLIC BLOOD PRESSURE: 104 MMHG | HEART RATE: 70 BPM | DIASTOLIC BLOOD PRESSURE: 84 MMHG | WEIGHT: 112 LBS | HEIGHT: 55 IN | OXYGEN SATURATION: 99 %

## 2022-06-06 DIAGNOSIS — Q90.9 DOWN SYNDROME: ICD-10-CM

## 2022-06-06 DIAGNOSIS — Z00.00 PHYSICAL EXAM: ICD-10-CM

## 2022-06-06 DIAGNOSIS — R73.9 ELEVATED BLOOD SUGAR: Primary | ICD-10-CM

## 2022-06-06 DIAGNOSIS — G47.33 OSA (OBSTRUCTIVE SLEEP APNEA): ICD-10-CM

## 2022-06-06 DIAGNOSIS — J01.00 ACUTE NON-RECURRENT MAXILLARY SINUSITIS: ICD-10-CM

## 2022-06-06 PROCEDURE — 99395 PREV VISIT EST AGE 18-39: CPT | Performed by: NURSE PRACTITIONER

## 2022-06-06 RX ORDER — AMOXICILLIN 875 MG/1
875 TABLET, COATED ORAL 2 TIMES DAILY
Qty: 14 TABLET | Refills: 0 | Status: SHIPPED | OUTPATIENT
Start: 2022-06-06 | End: 2022-06-16

## 2022-06-06 NOTE — PROGRESS NOTES
Subjective   Chantale Reynolds is a 22 y.o. female.   PMHX:Asthma,Gerd and allergies and Down syndrome and ENRIQUE and uses a sleep apnea machine  She currently has sinus issues. Has been using her allergy medications with minimal improvement.  She is also in PT weekly.  She is followed by Dr Macario lopez, GI Dr. Rene, asthma and allergy Dr. Anderson.  Has new hearing aids  PSHX:Heart surgery and tonsillectomy  PFHX:None  Exercise:Is dancing every day  Diet:Well balanced  Sleep hygiene:good sleep hygiene  Employment status:  Works at [employer]  Is in College part time.  Has chores at home  History of Present Illness   See above  The following portions of the patient's history were reviewed and updated as appropriate: allergies, current medications, past family history, past medical history, past social history, past surgical history and problem list.    Review of Systems   Constitutional: Negative for activity change, appetite change, chills and fever.   HENT: Negative for congestion.    Eyes: Negative for pain.   Respiratory: Negative for cough and shortness of breath.    Cardiovascular: Negative for chest pain.   Gastrointestinal: Negative for nausea and vomiting.   Genitourinary: Negative for difficulty urinating.   Skin: Negative for rash.   Neurological: Negative for dizziness, facial asymmetry and headache.       Objective   Physical Exam  Vitals and nursing note reviewed.   Constitutional:       General: She is not in acute distress.     Appearance: She is well-developed.   HENT:      Head: Normocephalic and atraumatic.      Right Ear: External ear normal.      Left Ear: External ear normal.   Neck:      Thyroid: No thyromegaly.   Cardiovascular:      Rate and Rhythm: Normal rate and regular rhythm.      Heart sounds: Normal heart sounds.   Pulmonary:      Effort: Pulmonary effort is normal.      Breath sounds: Normal breath sounds.   Musculoskeletal:         General: Normal range of motion.      Cervical  back: Neck supple.   Skin:     General: Skin is warm.   Neurological:      Mental Status: She is alert.           Assessment & Plan   Diagnoses and all orders for this visit:    1. Elevated blood sugar (Primary)  -     Hemoglobin A1c    2. Physical exam  -     Comprehensive Metabolic Panel  -     Lipid Panel With LDL / HDL Ratio  -     CBC (No Diff)    3. Down syndrome    4. ENRIQUE (obstructive sleep apnea)    5. Acute non-recurrent maxillary sinusitis    Other orders  -     amoxicillin (AMOXIL) 875 MG tablet; Take 1 tablet by mouth 2 (Two) Times a Day for 10 days.  Dispense: 14 tablet; Refill: 0      Will follow-up in a year for her annual exam with Kalpana Patrick.  Advised to continue follow-up with her specialists.  Preventative counseling for diet and exercise with patient at visit.  Pt reports that they wear their seatbelt regularly.

## 2022-06-06 NOTE — PATIENT INSTRUCTIONS
Preventive Care 21-39 Years Old, Female  Preventive care refers to lifestyle choices and visits with your health care provider that can promote health and wellness. This includes:  A yearly physical exam. This is also called an annual wellness visit.  Regular dental and eye exams.  Immunizations.  Screening for certain conditions.  Healthy lifestyle choices, such as:  Eating a healthy diet.  Getting regular exercise.  Not using drugs or products that contain nicotine and tobacco.  Limiting alcohol use.  What can I expect for my preventive care visit?  Physical exam  Your health care provider may check your:  Height and weight. These may be used to calculate your BMI (body mass index). BMI is a measurement that tells if you are at a healthy weight.  Heart rate and blood pressure.  Body temperature.  Skin for abnormal spots.  Counseling  Your health care provider may ask you questions about your:  Past medical problems.  Family's medical history.  Alcohol, tobacco, and drug use.  Emotional well-being.  Home life and relationship well-being.  Sexual activity.  Diet, exercise, and sleep habits.  Work and work environment.  Access to firearms.  Method of birth control.  Menstrual cycle.  Pregnancy history.  What immunizations do I need?    Vaccines are usually given at various ages, according to a schedule. Your health care provider will recommend vaccines for you based on your age, medical history, and lifestyle or other factors, such as travel or where you work.  What tests do I need?    Blood tests  Lipid and cholesterol levels. These may be checked every 5 years starting at age 20.  Hepatitis C test.  Hepatitis B test.  Screening  Diabetes screening. This is done by checking your blood sugar (glucose) after you have not eaten for a while (fasting).  STD (sexually transmitted disease) testing, if you are at risk.  BRCA-related cancer screening. This may be done if you have a family history of breast, ovarian, tubal,  or peritoneal cancers.  Pelvic exam and Pap test. This may be done every 3 years starting at age 21. Starting at age 30, this may be done every 5 years if you have a Pap test in combination with an HPV test.  Talk with your health care provider about your test results, treatment options, and if necessary, the need for more tests.  Follow these instructions at home:  Eating and drinking    Eat a healthy diet that includes fresh fruits and vegetables, whole grains, lean protein, and low-fat dairy products.  Take vitamin and mineral supplements as recommended by your health care provider.  Do not drink alcohol if:  Your health care provider tells you not to drink.  You are pregnant, may be pregnant, or are planning to become pregnant.  If you drink alcohol:  Limit how much you have to 0-1 drink a day.  Be aware of how much alcohol is in your drink. In the [place] one drink equals one 12 oz bottle of beer (355 mL), one 5 oz glass of wine (148 mL), or one 1½ oz glass of hard liquor (44 mL).    Lifestyle  Take daily care of your teeth and gums. Brush your teeth every morning and night with fluoride toothpaste. Floss one time each day.  Stay active. Exercise for at least 30 minutes 5 or more days each week.  Do not use any products that contain nicotine or tobacco, such as cigarettes, e-cigarettes, and chewing tobacco. If you need help quitting, ask your health care provider.  Do not use drugs.  If you are sexually active, practice safe sex. Use a condom or other form of protection to prevent STIs (sexually transmitted infections).  If you do not wish to become pregnant, use a form of birth control. If you plan to become pregnant, see your health care provider for a prepregnancy visit.  Find healthy ways to cope with stress, such as:  Meditation, yoga, or listening to music.  Journaling.  Talking to a trusted person.  Spending time with friends and family.  Safety  Always wear your seat belt while driving or riding in a  vehicle.  Do not drive:  If you have been drinking alcohol. Do not ride with someone who has been drinking.  When you are tired or distracted.  While texting.  Wear a helmet and other protective equipment during sports activities.  If you have firearms in your house, make sure you follow all gun safety procedures.  Seek help if you have been physically or sexually abused.  What's next?  Go to your health care provider once a year for an annual wellness visit.  Ask your health care provider how often you should have your eyes and teeth checked.  Stay up to date on all vaccines.  This information is not intended to replace advice given to you by your health care provider. Make sure you discuss any questions you have with your health care provider.  Document Revised: 08/15/2021 Document Reviewed: 08/29/2019  Elsevier Patient Education © 2021 [employer]

## 2022-06-07 LAB
ALBUMIN SERPL-MCNC: 3.8 G/DL (ref 3.9–5)
ALBUMIN/GLOB SERPL: 1.5 {RATIO} (ref 1.2–2.2)
ALP SERPL-CCNC: 78 IU/L (ref 44–121)
ALT SERPL-CCNC: 15 IU/L (ref 0–32)
AST SERPL-CCNC: 23 IU/L (ref 0–40)
BILIRUB SERPL-MCNC: 0.2 MG/DL (ref 0–1.2)
BUN SERPL-MCNC: 18 MG/DL (ref 6–20)
BUN/CREAT SERPL: 24 (ref 9–23)
CALCIUM SERPL-MCNC: 8.9 MG/DL (ref 8.7–10.2)
CHLORIDE SERPL-SCNC: 105 MMOL/L (ref 96–106)
CHOLEST SERPL-MCNC: 112 MG/DL (ref 100–199)
CO2 SERPL-SCNC: 23 MMOL/L (ref 20–29)
CREAT SERPL-MCNC: 0.74 MG/DL (ref 0.57–1)
EGFRCR SERPLBLD CKD-EPI 2021: 117 ML/MIN/1.73
ERYTHROCYTE [DISTWIDTH] IN BLOOD BY AUTOMATED COUNT: 12.5 % (ref 11.7–15.4)
GLOBULIN SER CALC-MCNC: 2.5 G/DL (ref 1.5–4.5)
GLUCOSE SERPL-MCNC: 48 MG/DL (ref 65–99)
HBA1C MFR BLD: 4.9 % (ref 4.8–5.6)
HCT VFR BLD AUTO: 42.7 % (ref 34–46.6)
HDLC SERPL-MCNC: 51 MG/DL
HGB BLD-MCNC: 14.4 G/DL (ref 11.1–15.9)
LDLC SERPL CALC-MCNC: 50 MG/DL (ref 0–99)
LDLC/HDLC SERPL: 1 RATIO (ref 0–3.2)
MCH RBC QN AUTO: 33.6 PG (ref 26.6–33)
MCHC RBC AUTO-ENTMCNC: 33.7 G/DL (ref 31.5–35.7)
MCV RBC AUTO: 100 FL (ref 79–97)
PLATELET # BLD AUTO: 168 X10E3/UL (ref 150–450)
POTASSIUM SERPL-SCNC: 4.3 MMOL/L (ref 3.5–5.2)
PROT SERPL-MCNC: 6.3 G/DL (ref 6–8.5)
RBC # BLD AUTO: 4.28 X10E6/UL (ref 3.77–5.28)
SODIUM SERPL-SCNC: 144 MMOL/L (ref 134–144)
TRIGL SERPL-MCNC: 44 MG/DL (ref 0–149)
VLDLC SERPL CALC-MCNC: 11 MG/DL (ref 5–40)
WBC # BLD AUTO: 8.1 X10E3/UL (ref 3.4–10.8)

## 2022-10-10 ENCOUNTER — TELEPHONE (OUTPATIENT)
Dept: OBSTETRICS AND GYNECOLOGY | Age: 22
End: 2022-10-10

## 2022-10-10 NOTE — TELEPHONE ENCOUNTER
Caller: Micki Reynolds    Relationship to patient: Mother    Best call back number: [phone]    Chief complaint: ANNUAL APPT    Type of visit: ANNUAL    Requested date:SOMETIME THIS YEAR        Additional notes:REQUESTING A SOONER APPT THAN NEXT YEAR FOR AN ANNUAL

## 2022-12-05 ENCOUNTER — OFFICE VISIT (OUTPATIENT)
Dept: OBSTETRICS AND GYNECOLOGY | Age: 22
End: 2022-12-05

## 2022-12-05 VITALS
WEIGHT: 111.6 LBS | SYSTOLIC BLOOD PRESSURE: 118 MMHG | DIASTOLIC BLOOD PRESSURE: 60 MMHG | HEIGHT: 55 IN | BODY MASS INDEX: 25.83 KG/M2

## 2022-12-05 DIAGNOSIS — Z00.00 ENCOUNTER FOR ANNUAL PHYSICAL EXAM: Primary | ICD-10-CM

## 2022-12-05 DIAGNOSIS — R82.90 CLOUDY URINE: ICD-10-CM

## 2022-12-05 LAB
BILIRUB BLD-MCNC: NEGATIVE MG/DL
CLARITY, POC: CLEAR
COLOR UR: YELLOW
GLUCOSE UR STRIP-MCNC: NEGATIVE MG/DL
KETONES UR QL: NEGATIVE
LEUKOCYTE EST, POC: NEGATIVE
NITRITE UR-MCNC: NEGATIVE MG/ML
PH UR: 5.5 [PH] (ref 5–8)
PROT UR STRIP-MCNC: NEGATIVE MG/DL
RBC # UR STRIP: NEGATIVE /UL
SP GR UR: 1.03 (ref 1–1.03)
UROBILINOGEN UR QL: NORMAL

## 2022-12-05 PROCEDURE — 81002 URINALYSIS NONAUTO W/O SCOPE: CPT | Performed by: OBSTETRICS & GYNECOLOGY

## 2022-12-05 PROCEDURE — 99395 PREV VISIT EST AGE 18-39: CPT | Performed by: OBSTETRICS & GYNECOLOGY

## 2022-12-05 RX ORDER — BORIC ACID
POWDER (GRAM) MISCELLANEOUS
COMMUNITY

## 2022-12-05 RX ORDER — MELATONIN 5 MG
5 TABLET,CHEWABLE ORAL DAILY
COMMUNITY
End: 2022-12-16

## 2022-12-05 NOTE — PROGRESS NOTES
Subjective     Chief Complaint   Patient presents with   • Annual Exam     AE today, Last AE 10/04/2021, Hx hysterectomy (2016), c/o cloudy urine         History of Present Illness  Wellness exam  Chantale Reynolds is a very pleasant 22 y.o. female ., Mammo Exam none, Exercise active  Patient had a supracervical hysterectomy she has no GYN concerns or complaints no bleeding no discharge  Her mom says sometimes her urine is cloudy but there is no signs of infection she sees her nurse practitioner for other wellness screenings and lab work.      Obstetric History:  OB History        0    Para   0    Term   0       0    AB   0    Living   0       SAB   0    IAB   0    Ectopic   0    Molar   0    Multiple   0    Live Births   0               Menstrual History:     Patient's last menstrual period was 2016.       Sexual History:       Past Medical History:   Diagnosis Date   • Acid reflux    • Anal fissure    • ASD (atrial septal defect)    • Asthma     MILD   • AVD (aortic valve disease)    • Congestive heart failure (HCC)    • Down syndrome    • Dysmenorrhea    • Irregular heart beat    • Knee pain     PAULY   • Pain in both feet    • Sleep apnea     WEARS CPAP   • Thyroid disease    • Urinary incontinence    • Uses hearing aid       Past Surgical History:   Procedure Laterality Date   • CARDIAC VALVE SURGERY     • EAR GRAFT BONE Left    • HYSTERECTOMY SUPRACERVICAL N/A 2016    Procedure: LAPAROSCOPIC ASSISTED SUPRACERVICAL HYSTERECTOMY WITH BILATERAL SALPINGECTOMY;  Surgeon: Howie Faria MD;  [place] [facility] [place] [facility] [place]:    • TONSILLECTOMY AND ADENOIDECTOMY     • TYMPANOPLASTY W/ MASTOIDECTOMY      X3       SOCIAL Hx:      The following portions of the patient's history were reviewed and updated as appropriate: allergies, current medications, past family history, past medical history, past social history, past surgical history and problem list.    Review of Systems        Except as outlined  "in history of physical illness, patient denies any changes in her GYN, , GI systems. All other systems reviewed are negative      Urinary incontinence assessment discussed       Objective   Physical Exam    /60   Ht 138.4 cm (54.5\")   Wt 50.6 kg (111 lb 9.6 oz)   LMP 05/19/2016   Breastfeeding No   BMI 26.42 kg/m²     General: Patient is alert and oriented and appears overall healthy  Neck: Is supple without thyromegaly, no carotid bruits and no lymphadenopathy  Lungs: Clear bilaterally, no wheezing, rhonchi, or rales.  Respiratory rate is normal  Breast: Symmetrical, no lymphadenopathy, no masses, no erythema.  Heart: Regular rate and rhythm are appreciated, no murmurs or rubs are heard  Abdomen: Is soft, without organomegaly, bowel sounds are positive, there is no                                rebound or guarding and palpation does not produce any discomfort  Back: Nontender without CVA tenderness  Pelvic: External genitalia appear normal and consistent with mature female.  BUS normal                  Urethral meatus is normal without discharge masses or tenderness                Urethra is normal without tenderness                Bladder is not enlarged not tender                            Vagina is clean dry without discharge and appears adequately estrogenized,               no lesions or masses are present              Uterus is absent              Adnexa are non-enlarged, non tender               Rectal digital exam reveals adequate sphincter tone and no masses or lesions             are  appreciated on digital rectal examination.         Patient Active Problem List   Diagnosis   • Down syndrome   • Injury of index finger   • Knee pain   • ENRIQUE (obstructive sleep apnea)   • Pain in joint involving pelvic region and thigh   • Patellar subluxation   • Patellofemoral pain syndrome of right knee   • Pes planovalgus   • Intact hymenal ring   • History of hysterectomy, supracervical           "     Assessment & Plan   Diagnoses and all orders for this visit:    1. Encounter for annual physical exam (Primary)    2. Cloudy urine  -     POC Urinalysis Dipstick        Discussed today's findings and concerns with patient.  Continue to recommend regular exercise including cardiovascular and resistance training as well as  breast self-exam. Wellness lab, mammography, & pap smear, in accordance with age guidelines.    I have encouraged her to call for today's test results if she has not received them within 10 days.  Patient is advised to call with any change in her condition or with any other questions, otherwise return  for annual examination.

## 2022-12-12 RX ORDER — POLYETHYLENE GLYCOL 3350 17 G/17G
17 POWDER, FOR SOLUTION ORAL DAILY
Qty: 510 G | Refills: 3 | Status: SHIPPED | OUTPATIENT
Start: 2022-12-12 | End: 2023-03-31

## 2022-12-16 ENCOUNTER — OFFICE VISIT (OUTPATIENT)
Dept: FAMILY MEDICINE CLINIC | Facility: CLINIC | Age: 22
End: 2022-12-16

## 2022-12-16 VITALS
RESPIRATION RATE: 18 BRPM | DIASTOLIC BLOOD PRESSURE: 78 MMHG | OXYGEN SATURATION: 98 % | SYSTOLIC BLOOD PRESSURE: 118 MMHG | HEART RATE: 78 BPM | HEIGHT: 55 IN | BODY MASS INDEX: 26.42 KG/M2

## 2022-12-16 DIAGNOSIS — J06.9 UPPER RESPIRATORY TRACT INFECTION, UNSPECIFIED TYPE: Primary | ICD-10-CM

## 2022-12-16 PROBLEM — Z98.890 S/P COMPLETE ATRIOVENTRICULAR CANAL REPAIR: Status: ACTIVE | Noted: 2019-10-07

## 2022-12-16 PROBLEM — K59.00 CONSTIPATION: Status: ACTIVE | Noted: 2017-02-20

## 2022-12-16 PROBLEM — S86.891A MEDIAL TIBIAL STRESS SYNDROME, RIGHT, INITIAL ENCOUNTER: Status: ACTIVE | Noted: 2021-08-17

## 2022-12-16 PROBLEM — H91.90: Status: ACTIVE | Noted: 2019-10-07

## 2022-12-16 PROBLEM — J30.2 SEASONAL ALLERGIES: Status: ACTIVE | Noted: 2019-09-30

## 2022-12-16 LAB
EXPIRATION DATE: NORMAL
FLUAV AG UPPER RESP QL IA.RAPID: NOT DETECTED
FLUBV AG UPPER RESP QL IA.RAPID: NOT DETECTED
INTERNAL CONTROL: NORMAL
Lab: NORMAL
SARS-COV-2 AG UPPER RESP QL IA.RAPID: NOT DETECTED

## 2022-12-16 PROCEDURE — 99213 OFFICE O/P EST LOW 20 MIN: CPT | Performed by: NURSE PRACTITIONER

## 2022-12-16 PROCEDURE — 87428 SARSCOV & INF VIR A&B AG IA: CPT | Performed by: NURSE PRACTITIONER

## 2022-12-16 RX ORDER — ALBUTEROL SULFATE 2.5 MG/3ML
2.5 SOLUTION RESPIRATORY (INHALATION) EVERY 4 HOURS PRN
Qty: 30 ML | Refills: 3 | Status: SHIPPED | OUTPATIENT
Start: 2022-12-16

## 2022-12-16 RX ORDER — AMOXICILLIN 875 MG/1
875 TABLET, COATED ORAL 2 TIMES DAILY
Qty: 14 TABLET | Refills: 0 | Status: SHIPPED | OUTPATIENT
Start: 2022-12-16 | End: 2022-12-23

## 2022-12-16 RX ORDER — DEXTROMETHORPHAN HYDROBROMIDE AND PROMETHAZINE HYDROCHLORIDE 15; 6.25 MG/5ML; MG/5ML
5 SYRUP ORAL 4 TIMES DAILY PRN
Qty: 180 ML | Refills: 0 | Status: SHIPPED | OUTPATIENT
Start: 2022-12-16

## 2022-12-16 NOTE — PROGRESS NOTES
Subjective   Chantale Reynolds is a 22 y.o. female.     History of Present Illness   Patient presents with cough and congestion X 2 weeks. Her mother reports that she has started to have cough and thinks that it's moved into her chest.  Her mother is presents and assisted with history. Patient takes daily allergy medications and is followed by allergy. She denies any shortness of breath or fever. She is taking over the counter sudafed.     The following portions of the patient's history were reviewed and updated as appropriate: allergies, current medications, past family history, past medical history, past social history, past surgical history and problem list.    Review of Systems   Constitutional: Negative for appetite change, chills, fatigue and fever.   HENT: Positive for congestion, postnasal drip and rhinorrhea. Negative for ear pain, sinus pressure, sneezing, sore throat, tinnitus and voice change.    Eyes: Positive for discharge and redness. Negative for itching.   Respiratory: Positive for cough. Negative for chest tightness, shortness of breath and wheezing.    Cardiovascular: Negative for chest pain, palpitations and leg swelling.   Musculoskeletal: Negative for myalgias.   Allergic/Immunologic: Negative.    Neurological: Negative for dizziness and headache.       Objective   Physical Exam  Vitals and nursing note reviewed.   Constitutional:       Appearance: She is well-developed.   HENT:      Head: Normocephalic and atraumatic.      Right Ear: Tympanic membrane, ear canal and external ear normal.      Left Ear: Tympanic membrane, ear canal and external ear normal.      Nose: Rhinorrhea present.      Right Sinus: No maxillary sinus tenderness or frontal sinus tenderness.      Left Sinus: No maxillary sinus tenderness or frontal sinus tenderness.      Mouth/Throat:      Lips: Pink.      Mouth: Mucous membranes are dry.      Pharynx: Oropharynx is clear. Posterior oropharyngeal erythema present. No  oropharyngeal exudate.      Tonsils: No tonsillar exudate.   Eyes:      General:         Right eye: No discharge.         Left eye: No discharge.      Conjunctiva/sclera: Conjunctivae normal.      Pupils: Pupils are equal, round, and reactive to light.   Neck:      Thyroid: No thyromegaly.   Cardiovascular:      Rate and Rhythm: Normal rate and regular rhythm.      Heart sounds: Normal heart sounds. No murmur heard.  Pulmonary:      Effort: Pulmonary effort is normal. No tachypnea or respiratory distress.      Breath sounds: Normal breath sounds. No decreased breath sounds, wheezing or rales.   Musculoskeletal:      Cervical back: Normal range of motion and neck supple.   Lymphadenopathy:      Cervical: No cervical adenopathy.   Skin:     General: Skin is warm and dry.   Neurological:      Mental Status: She is alert and oriented to person, place, and time.   Psychiatric:         Behavior: Behavior normal.         Thought Content: Thought content normal.         Judgment: Judgment normal.         Vitals:    12/16/22 1030   BP: 118/78   Pulse: 78   Resp: 18   SpO2: 98%     Body mass index is 26.42 kg/m².      Procedures    Assessment & Plan   Problems Addressed this Visit    None  Visit Diagnoses     Upper respiratory tract infection, unspecified type    -  Primary    Relevant Medications    albuterol (PROVENTIL) (2.5 MG/3ML) 0.083% nebulizer solution    promethazine-dextromethorphan (PROMETHAZINE-DM) 6.25-15 MG/5ML syrup    amoxicillin (AMOXIL) 875 MG tablet      Diagnoses       Codes Comments    Upper respiratory tract infection, unspecified type    -  Primary ICD-10-CM: J06.9  ICD-9-CM: 465.9         POCT Covid + flu - negative  Amoxicillin 875mg 1p.o. BID X 7 days  Promethazine DM 5ML as needed for cough  Refilled albuterol nebulizer Q4H PRN for wheezing and shortness of breath.   May continue sudafed over the counter  Mother requested PCP be Kalpana Patrick       Return if symptoms worsen or fail to  improve.  Answers for HPI/ROS submitted by the patient on 12/16/2022  Please describe your symptoms.: Upper and lower respiratory congestion,  fragile ear syndrome,  cardiac history,  down syndrome  Have you had these symptoms before?: Yes  How long have you been having these symptoms?: Greater than 2 weeks  Please list any medications you are currently taking for this condition.: Sudafed, mucinex, Zyrtec, Robitussin  Please describe any probable cause for these symptoms. : History of sinus infection, bronchitis, pretty bad allergies for past several months  What is the primary reason for your visit?: Other

## 2023-02-16 RX ORDER — DOCUSATE SODIUM 100 MG/1
100 CAPSULE, LIQUID FILLED ORAL
Qty: 90 CAPSULE | Refills: 3 | Status: SHIPPED | OUTPATIENT
Start: 2023-02-16

## 2023-02-20 ENCOUNTER — TELEPHONE (OUTPATIENT)
Dept: FAMILY MEDICINE CLINIC | Facility: CLINIC | Age: 23
End: 2023-02-20
Payer: COMMERCIAL

## 2023-02-20 NOTE — TELEPHONE ENCOUNTER
We received a faxed alternative request for pt's albuterol nebulizer solution stating that the medication is on backorder. Would you be able to send in something else for patient while Lily is out?

## 2023-02-21 NOTE — TELEPHONE ENCOUNTER
Called pharmacy and they said that they have albuterol sulfate inhalation solution 1.25 mg/3 mL in stock

## 2023-02-23 DIAGNOSIS — J06.9 UPPER RESPIRATORY TRACT INFECTION, UNSPECIFIED TYPE: ICD-10-CM

## 2023-02-23 RX ORDER — ALBUTEROL SULFATE 1.25 MG/3ML
1 SOLUTION RESPIRATORY (INHALATION) EVERY 6 HOURS PRN
Qty: 1 EACH | Refills: 12 | Status: SHIPPED | OUTPATIENT
Start: 2023-02-23

## 2023-03-31 RX ORDER — POLYETHYLENE GLYCOL 3350 17 G/17G
POWDER, FOR SOLUTION ORAL
Qty: 238 G | Refills: 1 | Status: SHIPPED | OUTPATIENT
Start: 2023-03-31

## 2023-05-08 ENCOUNTER — TELEPHONE (OUTPATIENT)
Dept: FAMILY MEDICINE CLINIC | Facility: CLINIC | Age: 23
End: 2023-05-08

## 2023-05-08 NOTE — TELEPHONE ENCOUNTER
Caller: [facility]    Relationship: Other    Best call back number: [phone]    What specialty or service is being requested: SPEECH THERAPY    What is the provider, practice or medical service name: [facility]    What is the office location: [place] [address]    What is the office phone number: [phone]    Any additional details: [facility] REQUESTING A REFERRAL FOR PATIENT FOR SPEECH THERAPY    PLEASE ADVISE

## 2023-05-09 DIAGNOSIS — F80.2 MIXED RECEPTIVE-EXPRESSIVE LANGUAGE DISORDER: ICD-10-CM

## 2023-05-09 DIAGNOSIS — Q90.9 DOWN'S SYNDROME: Primary | ICD-10-CM

## 2023-06-12 ENCOUNTER — OFFICE VISIT (OUTPATIENT)
Dept: FAMILY MEDICINE CLINIC | Facility: CLINIC | Age: 23
End: 2023-06-12
Payer: MEDICARE

## 2023-06-12 VITALS
BODY MASS INDEX: 27.08 KG/M2 | DIASTOLIC BLOOD PRESSURE: 78 MMHG | HEART RATE: 66 BPM | RESPIRATION RATE: 18 BRPM | WEIGHT: 117 LBS | SYSTOLIC BLOOD PRESSURE: 100 MMHG | OXYGEN SATURATION: 100 % | HEIGHT: 55 IN

## 2023-06-12 DIAGNOSIS — Q90.9 TRISOMY 21: ICD-10-CM

## 2023-06-12 DIAGNOSIS — M54.9 ARTHRALGIA OF BACK: ICD-10-CM

## 2023-06-12 DIAGNOSIS — E03.9 HYPOTHYROIDISM (ACQUIRED): ICD-10-CM

## 2023-06-12 DIAGNOSIS — R79.9 ABNORMAL FINDING OF BLOOD CHEMISTRY, UNSPECIFIED: ICD-10-CM

## 2023-06-12 DIAGNOSIS — Z11.59 ENCOUNTER FOR HEPATITIS C SCREENING TEST FOR LOW RISK PATIENT: ICD-10-CM

## 2023-06-12 DIAGNOSIS — H60.312 ACUTE DIFFUSE OTITIS EXTERNA OF LEFT EAR: Primary | ICD-10-CM

## 2023-06-12 DIAGNOSIS — E55.9 VITAMIN D DEFICIENCY: ICD-10-CM

## 2023-06-12 PROBLEM — U07.1 COVID-19: Status: ACTIVE | Noted: 2023-03-06

## 2023-06-12 RX ORDER — TOBRAMYCIN AND DEXAMETHASONE 3; 1 MG/ML; MG/ML
SUSPENSION/ DROPS OPHTHALMIC
Qty: 3 ML | Refills: 0 | Status: SHIPPED | OUTPATIENT
Start: 2023-06-12

## 2023-06-12 RX ORDER — CETIRIZINE HYDROCHLORIDE 10 MG/1
10 TABLET, CHEWABLE ORAL
COMMUNITY
End: 2023-06-12

## 2023-06-12 RX ORDER — AZELASTINE HYDROCHLORIDE 137 UG/1
SPRAY, METERED NASAL
COMMUNITY
Start: 2023-05-09

## 2023-06-12 RX ORDER — OLOPATADINE HYDROCHLORIDE 2 MG/ML
1 SOLUTION/ DROPS OPHTHALMIC
COMMUNITY
End: 2023-06-12

## 2023-06-12 RX ORDER — MELATONIN
5000
COMMUNITY
End: 2023-06-12

## 2023-06-12 NOTE — PROGRESS NOTES
Subjective   Chantale Reynolds is a 23 y.o. female.     History of Present Illness   Estab pt here with mom. Here for chronic illness mgmnt. Some new complains stiffness.     L shoulder stiff and lower back is hurting. She is propping up pillows to sleep. She thinks she needs new mattress. No falls or acute injury. Mom has rheumatologic disease and mom would like labs checked. No joint swelling. No rash. Has prev done [facility].     Chronic seasonal allergies. She is doing shots weekly, eye drops and zyrtec, asterol spray. No fevers. L ear infection, has been using tobradex for last few days .    Chronic Hypothyroidism x years. She is on levothyroxine 88mcg. She has some difficulty with sleep. Has had sleep study done. She has room black out curtains. Some music to help soothe. Minimal caffeine. She denies hair loss.     Acute L ear pain after swimming, Mom has old tobradex would like new RX. Multiple surgeries to L ear in the past.     The following portions of the patient's history were reviewed and updated as appropriate: allergies, current medications, past family history, past medical history, past social history, past surgical history and problem list.    Review of Systems   Constitutional:  Positive for activity change and fatigue. Negative for fever, unexpected weight gain and unexpected weight loss.   HENT:  Positive for ear pain. Negative for congestion and postnasal drip.    Eyes:  Negative for blurred vision and double vision.   Respiratory:  Negative for cough, chest tightness, shortness of breath and wheezing.    Cardiovascular:  Negative for chest pain, palpitations and leg swelling.   Gastrointestinal:  Negative for abdominal pain, constipation, diarrhea and GERD.   Endocrine: Negative for cold intolerance, heat intolerance, polydipsia, polyphagia and polyuria.   Genitourinary:  Negative for dysuria and frequency.   Musculoskeletal:  Positive for arthralgias.   Neurological:  Negative for dizziness  and headache.   Hematological:  Does not bruise/bleed easily.   Psychiatric/Behavioral:  Positive for sleep disturbance. Negative for depressed mood. The patient is not nervous/anxious.      Objective   Physical Exam  Vitals reviewed.   Constitutional:       Appearance: Normal appearance. She is normal weight.   HENT:      Head: Normocephalic.      Right Ear: Tympanic membrane normal.      Left Ear: Tympanic membrane is erythematous.      Ears:      Comments: Red canal       Nose: Nose normal. No congestion.      Mouth/Throat:      Mouth: Mucous membranes are moist.      Pharynx: Oropharynx is clear.   Eyes:      Pupils: Pupils are equal, round, and reactive to light.   Cardiovascular:      Rate and Rhythm: Normal rate and regular rhythm.      Pulses: Normal pulses.      Heart sounds: Normal heart sounds. No murmur heard.  Pulmonary:      Effort: Pulmonary effort is normal.      Breath sounds: Normal breath sounds. No wheezing or rhonchi.   Abdominal:      General: Abdomen is flat. Bowel sounds are normal.      Palpations: Abdomen is soft.      Tenderness: There is no abdominal tenderness. There is no guarding.   Musculoskeletal:         General: No swelling. Normal range of motion.      Cervical back: Normal range of motion.   Skin:     General: Skin is warm.   Neurological:      Mental Status: She is alert. Mental status is at baseline.   Psychiatric:         Mood and Affect: Mood normal.       Vitals:    06/12/23 0904   BP: 100/78   Pulse: 66   Resp: 18   SpO2: 100%     Body mass index is 27.69 kg/m².    Procedures    Assessment & Plan   Problems Addressed this Visit          Endocrine and Metabolic    Vitamin D deficiency    Relevant Orders    Vitamin D,25-Hydroxy     Other Visit Diagnoses       Acute diffuse otitis externa of left ear    -  Primary    Arthralgia of back        Relevant Orders    DANIEL    C-reactive protein    Rheumatoid Factor    Hypothyroidism (acquired)        Relevant Orders    Comprehensive  Metabolic Panel    CBC & Differential    Lipid Panel With / Chol / HDL Ratio    TSH    Trisomy 21        Relevant Orders    Hemoglobin A1c    Abnormal finding of blood chemistry, unspecified        Relevant Orders    Hemoglobin A1c    Encounter for hepatitis C screening test for low risk patient        Relevant Orders    Hepatitis C antibody          Diagnoses         Codes Comments    Acute diffuse otitis externa of left ear    -  Primary ICD-10-CM: H60.312  ICD-9-CM: 380.10     Arthralgia of back     ICD-10-CM: M54.9  ICD-9-CM: 724.5     Hypothyroidism (acquired)     ICD-10-CM: E03.9  ICD-9-CM: 244.9     Vitamin D deficiency     ICD-10-CM: E55.9  ICD-9-CM: 268.9     Trisomy 21     ICD-10-CM: Q90.9  ICD-9-CM: 758.0     Abnormal finding of blood chemistry, unspecified     ICD-10-CM: R79.9  ICD-9-CM: 790.6     Encounter for hepatitis C screening test for low risk patient     ICD-10-CM: Z11.59  ICD-9-CM: V73.89           Orders Placed This Encounter   Procedures    Tdap Vaccine Greater Than or Equal To 8yo IM    Comprehensive Metabolic Panel     Order Specific Question:   Release to patient     Answer:   Routine Release    Lipid Panel With / Chol / HDL Ratio     Order Specific Question:   Release to patient     Answer:   Routine Release    TSH     Order Specific Question:   Release to patient     Answer:   Routine Release    Hemoglobin A1c     Order Specific Question:   Release to patient     Answer:   Routine Release    Vitamin D,25-Hydroxy     Order Specific Question:   Release to patient     Answer:   Routine Release    DANIEL     Order Specific Question:   Release to patient     Answer:   Routine Release    C-reactive protein     Order Specific Question:   Release to patient     Answer:   Routine Release    Rheumatoid Factor     Order Specific Question:   Release to patient     Answer:   Routine Release    Hepatitis C antibody     Order Specific Question:   Release to patient     Answer:   Routine Release    CBC &  Differential     Joint pain- labs, use heat, ice, topical rubs, tylenol as needed    OE-rx tobradex refill, fu ENT    Allergies- cw allergist, cw all meds, avoid triggers    Hypothyroidims- check labs, cw levothyroxine 88mcg    Annual due December          Education provided in AVS   Return in about 1 year (around 6/12/2024) for Recheck, Medicare Wellness.

## 2023-06-13 DIAGNOSIS — M21.6X1 PRONATION DEFORMITY OF BOTH FEET: Primary | ICD-10-CM

## 2023-06-13 DIAGNOSIS — M21.6X2 PRONATION DEFORMITY OF BOTH FEET: Primary | ICD-10-CM

## 2023-06-13 LAB
25(OH)D3+25(OH)D2 SERPL-MCNC: 48.4 NG/ML (ref 30–100)
ALBUMIN SERPL-MCNC: 4.3 G/DL (ref 3.5–5.2)
ALBUMIN/GLOB SERPL: 1.9 G/DL
ALP SERPL-CCNC: 88 U/L (ref 39–117)
ALT SERPL-CCNC: 15 U/L (ref 1–33)
ANA SER QL: NEGATIVE
AST SERPL-CCNC: 23 U/L (ref 1–32)
BASOPHILS # BLD AUTO: 0.04 10*3/MM3 (ref 0–0.2)
BASOPHILS NFR BLD AUTO: 0.7 % (ref 0–1.5)
BILIRUB SERPL-MCNC: 0.3 MG/DL (ref 0–1.2)
BUN SERPL-MCNC: 18 MG/DL (ref 6–20)
BUN/CREAT SERPL: 21.2 (ref 7–25)
CALCIUM SERPL-MCNC: 9.6 MG/DL (ref 8.6–10.5)
CHLORIDE SERPL-SCNC: 104 MMOL/L (ref 98–107)
CHOLEST SERPL-MCNC: 130 MG/DL (ref 0–200)
CHOLEST/HDLC SERPL: 2.24 {RATIO}
CO2 SERPL-SCNC: 24.7 MMOL/L (ref 22–29)
CREAT SERPL-MCNC: 0.85 MG/DL (ref 0.57–1)
CRP SERPL-MCNC: 0.44 MG/DL (ref 0–0.5)
EGFRCR SERPLBLD CKD-EPI 2021: 98.3 ML/MIN/1.73
EOSINOPHIL # BLD AUTO: 0.08 10*3/MM3 (ref 0–0.4)
EOSINOPHIL NFR BLD AUTO: 1.4 % (ref 0.3–6.2)
ERYTHROCYTE [DISTWIDTH] IN BLOOD BY AUTOMATED COUNT: 12.9 % (ref 12.3–15.4)
GLOBULIN SER CALC-MCNC: 2.3 GM/DL
GLUCOSE SERPL-MCNC: 49 MG/DL (ref 65–99)
HBA1C MFR BLD: 4.8 % (ref 4.8–5.6)
HCT VFR BLD AUTO: 45.4 % (ref 34–46.6)
HCV IGG SERPL QL IA: NON REACTIVE
HDLC SERPL-MCNC: 58 MG/DL (ref 40–60)
HGB BLD-MCNC: 15.3 G/DL (ref 12–15.9)
IMM GRANULOCYTES # BLD AUTO: 0.01 10*3/MM3 (ref 0–0.05)
IMM GRANULOCYTES NFR BLD AUTO: 0.2 % (ref 0–0.5)
LDLC SERPL CALC-MCNC: 58 MG/DL (ref 0–100)
LYMPHOCYTES # BLD AUTO: 1.27 10*3/MM3 (ref 0.7–3.1)
LYMPHOCYTES NFR BLD AUTO: 22.5 % (ref 19.6–45.3)
MCH RBC QN AUTO: 33 PG (ref 26.6–33)
MCHC RBC AUTO-ENTMCNC: 33.7 G/DL (ref 31.5–35.7)
MCV RBC AUTO: 97.8 FL (ref 79–97)
MONOCYTES # BLD AUTO: 0.38 10*3/MM3 (ref 0.1–0.9)
MONOCYTES NFR BLD AUTO: 6.7 % (ref 5–12)
NEUTROPHILS # BLD AUTO: 3.86 10*3/MM3 (ref 1.7–7)
NEUTROPHILS NFR BLD AUTO: 68.5 % (ref 42.7–76)
NRBC BLD AUTO-RTO: 0 /100 WBC (ref 0–0.2)
PLATELET # BLD AUTO: 192 10*3/MM3 (ref 140–450)
POTASSIUM SERPL-SCNC: 4 MMOL/L (ref 3.5–5.2)
PROT SERPL-MCNC: 6.6 G/DL (ref 6–8.5)
RBC # BLD AUTO: 4.64 10*6/MM3 (ref 3.77–5.28)
RHEUMATOID FACT SERPL-ACNC: <10 IU/ML
SODIUM SERPL-SCNC: 141 MMOL/L (ref 136–145)
TRIGL SERPL-MCNC: 71 MG/DL (ref 0–150)
TSH SERPL DL<=0.005 MIU/L-ACNC: 3.52 UIU/ML (ref 0.27–4.2)
VLDLC SERPL CALC-MCNC: 14 MG/DL (ref 5–40)
WBC # BLD AUTO: 5.64 10*3/MM3 (ref 3.4–10.8)

## 2023-08-14 ENCOUNTER — TELEPHONE (OUTPATIENT)
Dept: FAMILY MEDICINE CLINIC | Facility: CLINIC | Age: 23
End: 2023-08-14
Payer: MEDICARE

## 2024-01-02 ENCOUNTER — OFFICE VISIT (OUTPATIENT)
Dept: OBSTETRICS AND GYNECOLOGY | Age: 24
End: 2024-01-02
Payer: MEDICARE

## 2024-01-02 VITALS
SYSTOLIC BLOOD PRESSURE: 116 MMHG | DIASTOLIC BLOOD PRESSURE: 70 MMHG | WEIGHT: 120 LBS | HEIGHT: 65 IN | BODY MASS INDEX: 19.99 KG/M2

## 2024-01-02 DIAGNOSIS — R82.90 CLOUDY URINE: ICD-10-CM

## 2024-01-02 DIAGNOSIS — Z00.00 ENCOUNTER FOR ANNUAL PHYSICAL EXAM: ICD-10-CM

## 2024-01-02 DIAGNOSIS — Q90.9 DOWN SYNDROME: ICD-10-CM

## 2024-01-02 DIAGNOSIS — N89.6 INTACT HYMENAL RING: ICD-10-CM

## 2024-01-02 DIAGNOSIS — Z90.711 HISTORY OF HYSTERECTOMY, SUPRACERVICAL: Primary | ICD-10-CM

## 2024-01-02 NOTE — PROGRESS NOTES
Subjective     Chief Complaint   Patient presents with    Gynecologic Exam     Annual: no complaints         History of Present Illness    Wellness exam  Chantale Reynolds is a very pleasant  23 y.o. female who presents for annual exam.  Mammo Exam none, Contraception not sexually active and has previously had a supracervical hysterectomy, Exercise encouraged.      Obstetric History:  OB History          0    Para   0    Term   0       0    AB   0    Living   0         SAB   0    IAB   0    Ectopic   0    Molar   0    Multiple   0    Live Births   0               Menstrual History:     Patient's last menstrual period was 2016.       Sexual History:       Past Medical History:   Diagnosis Date    Acid reflux     Anal fissure     ASD (atrial septal defect)     Asthma     MILD    AVD (aortic valve disease)     Congestive heart failure     Down syndrome     Dysmenorrhea     Irregular heart beat     Knee pain     PAULY    Pain in both feet     Sleep apnea     WEARS CPAP    Thyroid disease     Urinary incontinence     Uses hearing aid      Past Surgical History:   Procedure Laterality Date    CARDIAC VALVE SURGERY      EAR GRAFT BONE Left     HYSTERECTOMY SUPRACERVICAL N/A 2016    Procedure: LAPAROSCOPIC ASSISTED SUPRACERVICAL HYSTERECTOMY WITH BILATERAL SALPINGECTOMY;  Surgeon: Howie Faria MD;  [place] [facility] [place] [facility] [place]  Service:     TONSILLECTOMY AND ADENOIDECTOMY      TYMPANOPLASTY W/ MASTOIDECTOMY      X3       SOCIAL Hx:      The following portions of the patient's history were reviewed and updated as appropriate: allergies, current medications, past family history, past medical history, past social history, past surgical history and problem list.    Review of Systems        Except as outlined in history of physical illness, patient denies any changes in her GYN, , GI systems.  All other systems reviewed were negative.         Current Outpatient Medications:     albuterol (ACCUNEB) 1.25 MG/3ML  nebulizer solution, Take 3 mL by nebulization Every 6 (Six) Hours As Needed for Wheezing., Disp: 1 each, Rfl: 12    albuterol (PROVENTIL) (2.5 MG/3ML) 0.083% nebulizer solution, Take 2.5 mg by nebulization Every 4 (Four) Hours As Needed for Wheezing or Shortness of Air., Disp: 30 mL, Rfl: 3    ALLERGY SERUM INJECTION, Inject  under the skin 1 (One) Time., Disp: , Rfl:     Ascorbic Acid 500 MG chewable tablet, Chew 1,000 mg., Disp: , Rfl:     Azelastine HCl 137 MCG/SPRAY solution, INSTILL 1-2 SPRAYS IN EACH NOSTRIL TWICE DAILY AS NEEDED, Disp: , Rfl:     B Complex Vitamins (VITAMIN B COMPLEX PO), Take  by mouth., Disp: , Rfl:     Boric Acid powder, , Disp: , Rfl:     cetirizine (zyrTEC) 10 MG tablet, TAKE 1 TABLET BY MOUTH EVERY DAY, Disp: 30 tablet, Rfl: 5    Cholecalciferol (VITAMIN D) 2000 UNITS tablet, Take 1 tablet by mouth Daily., Disp: , Rfl:     CINNAMON PO, Take  by mouth., Disp: , Rfl:     CRANBERRY EXTRACT PO, Take  by mouth., Disp: , Rfl:     docusate sodium (COLACE) 100 MG capsule, Take 1 capsule by mouth every night at bedtime., Disp: 90 capsule, Rfl: 3    Emollient (AQUAPHOR ADVANCED THERAPY EX), Apply  topically., Disp: , Rfl:     Flaxseed, Linseed, (FLAX SEED OIL PO), Take  by mouth., Disp: , Rfl:     ketoconazole (NIZORAL) 2 % shampoo, , Disp: , Rfl:     levothyroxine (SYNTHROID, LEVOTHROID) 88 MCG tablet, Take 1 tablet by mouth Daily., Disp: , Rfl:     montelukast (SINGULAIR) 10 MG tablet, Take 1 tablet by mouth Daily., Disp: , Rfl: 11    mupirocin (BACTROBAN) 2 % ointment, APPLY TWICE DAILY TO BOILS AND CYSTS IN GROIN REGION, Disp: , Rfl:     NON FORMULARY, 500 mcg. Garden of [employer] b12 spray, Disp: , Rfl:     olopatadine (PATADAY) 0.2 % solution ophthalmic solution, INSTILL 1 DROP INTO BOTH EYES EVERY DAY AS NEEDED, Disp: , Rfl:     polyethylene glycol (MIRALAX) 17 GM/SCOOP powder, DISSOLVE 17 GRAMS INTO WATER AND DRINK BY MOUTH EVERY DAY, Disp: 238 g, Rfl: 1    Probiotic Product (PROBIOTIC-10 PO),  "Take  by mouth., Disp: , Rfl:     Siladryl Allergy 12.5 MG/5ML liquid, TAKE 10 MLS BY MOUTH EVERY 4 HOURS AS NEEDED, Disp: , Rfl:     tobramycin 0.3 % solution ophthalmic solution, PLEASE SEE ATTACHED FOR DETAILED DIRECTIONS, Disp: , Rfl:     tobramycin-dexamethasone (TOBRADEX) 0.3-0.1 % ophthalmic suspension, L ear, Disp: 3 mL, Rfl: 0    vitamin C (ASCORBIC ACID) 500 MG tablet, Take 2 tablets by mouth Daily., Disp: , Rfl:     Zinc 22.5 MG tablet, Take 22.5 mg by mouth., Disp: , Rfl:    Objective   Physical Exam    /70   Ht 163.8 cm (64.5\")   Wt 54.4 kg (120 lb)   LMP 05/19/2016   BMI 20.28 kg/m²     General: Patient is alert and oriented and appears overall healthy  Neck: Is supple without thyromegaly, no carotid bruits and no lymphadenopathy  Lungs: Clear bilaterally, no wheezing, rhonchi, or rales.  Respiratory rate is normal  Breast: Even, symmetrical, no lymphadenopathy, no retraction, no masses or cysts  Heart: Regular rate and rhythm are appreciated, no murmurs or rubs are heard  Abdomen: Is soft, without organomegaly, bowel sounds are positive, there is no rebound or guarding and palpation does not produce any discomfort  Back: Nontender without CVA tenderness  Pelvic: External genitalia appear normal and consistent with mature female.  BUS normal                            Vagina is clean dry without discharge and appears adequately estrogenized, no lesions or masses are present                         Cervix is noninflamed without discharge or lesions.  There is no cervical motion tenderness.              Absent uterus           adnexa are non-enlarged, non tender               Rectal exam reveals adequate sphincter tone and no masses or lesions are appreciated on digital rectal examination.      Annual Well Woman Exam  Patient Active Problem List   Diagnosis    Down syndrome    Injury of index finger    Knee pain    ENRIQUE (obstructive sleep apnea)    Pain in joint involving pelvic region and thigh "    Patellar subluxation    Patellofemoral pain syndrome of right knee    Pes planovalgus    Intact hymenal ring    History of hysterectomy, supracervical    Astigmatism    Constipation    Hashimoto's thyroiditis    Hearing deficit, unspecified laterality    Medial tibial stress syndrome, right, initial encounter    Primary hypothyroidism    S/P complete atrioventricular canal repair    Seasonal allergies    Snoring    Vitamin D deficiency    Complete trisomy 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 History of hysterectomy, supracervical (Primary)    2. Intact hymenal ring    3. Encounter for annual physical exam    4. Down syndrome    5. Cloudy urine    Urinalysis is negative  Discussed today's findings and concerns with patient.  Continue to recommend regular exercise including cardiovascular and resistance training as well as  breast self-exam. Wellness lab, mammography, & pap smear, in accordance with age guidelines.    I have encouraged her to call for today's test results if she has not received them within 10 days.  Patient is advised to call with any change in her condition or with any other questions, otherwise return  for annual examination.

## 2024-02-26 RX ORDER — DOCUSATE SODIUM 100 MG/1
100 CAPSULE, LIQUID FILLED ORAL
Qty: 90 CAPSULE | Refills: 3 | Status: SHIPPED | OUTPATIENT
Start: 2024-02-26

## 2024-06-13 ENCOUNTER — OFFICE VISIT (OUTPATIENT)
Dept: FAMILY MEDICINE CLINIC | Facility: CLINIC | Age: 24
End: 2024-06-13
Payer: MEDICARE

## 2024-06-13 VITALS
DIASTOLIC BLOOD PRESSURE: 82 MMHG | HEART RATE: 61 BPM | OXYGEN SATURATION: 100 % | HEIGHT: 64 IN | WEIGHT: 120 LBS | RESPIRATION RATE: 18 BRPM | BODY MASS INDEX: 20.49 KG/M2 | SYSTOLIC BLOOD PRESSURE: 112 MMHG

## 2024-06-13 DIAGNOSIS — R14.0 ABDOMINAL BLOATING: ICD-10-CM

## 2024-06-13 DIAGNOSIS — Z00.00 ENCOUNTER FOR SUBSEQUENT ANNUAL WELLNESS VISIT (AWV) IN MEDICARE PATIENT: Primary | ICD-10-CM

## 2024-06-13 DIAGNOSIS — G89.29 CHRONIC LEFT SHOULDER PAIN: ICD-10-CM

## 2024-06-13 DIAGNOSIS — R79.9 ABNORMAL FINDING OF BLOOD CHEMISTRY, UNSPECIFIED: ICD-10-CM

## 2024-06-13 DIAGNOSIS — E55.9 VITAMIN D DEFICIENCY, UNSPECIFIED: ICD-10-CM

## 2024-06-13 DIAGNOSIS — R53.82 CHRONIC FATIGUE: ICD-10-CM

## 2024-06-13 DIAGNOSIS — R10.30 LOWER ABDOMINAL PAIN: ICD-10-CM

## 2024-06-13 DIAGNOSIS — M25.512 CHRONIC LEFT SHOULDER PAIN: ICD-10-CM

## 2024-06-13 LAB
25(OH)D3+25(OH)D2 SERPL-MCNC: 53.9 NG/ML (ref 30–100)
ALBUMIN SERPL-MCNC: 4 G/DL (ref 3.5–5.2)
ALBUMIN/GLOB SERPL: 1.8 G/DL
ALP SERPL-CCNC: 78 U/L (ref 39–117)
ALT SERPL-CCNC: 15 U/L (ref 1–33)
AST SERPL-CCNC: 21 U/L (ref 1–32)
BILIRUB SERPL-MCNC: 0.4 MG/DL (ref 0–1.2)
BUN SERPL-MCNC: 15 MG/DL (ref 6–20)
BUN/CREAT SERPL: 19.2 (ref 7–25)
CALCIUM SERPL-MCNC: 9.3 MG/DL (ref 8.6–10.5)
CHLORIDE SERPL-SCNC: 106 MMOL/L (ref 98–107)
CHOLEST SERPL-MCNC: 124 MG/DL (ref 0–200)
CO2 SERPL-SCNC: 26 MMOL/L (ref 22–29)
CREAT SERPL-MCNC: 0.78 MG/DL (ref 0.57–1)
EGFRCR SERPLBLD CKD-EPI 2021: 108.3 ML/MIN/1.73
FERRITIN SERPL-MCNC: 201 NG/ML (ref 13–150)
FOLATE SERPL-MCNC: >20 NG/ML (ref 4.78–24.2)
GLOBULIN SER CALC-MCNC: 2.2 GM/DL
GLUCOSE SERPL-MCNC: 73 MG/DL (ref 65–99)
HBA1C MFR BLD: 4.7 % (ref 4.8–5.6)
HDLC SERPL-MCNC: 54 MG/DL (ref 40–60)
LDLC SERPL CALC-MCNC: 57 MG/DL (ref 0–100)
POTASSIUM SERPL-SCNC: 4.4 MMOL/L (ref 3.5–5.2)
PROT SERPL-MCNC: 6.2 G/DL (ref 6–8.5)
SODIUM SERPL-SCNC: 140 MMOL/L (ref 136–145)
TRIGL SERPL-MCNC: 59 MG/DL (ref 0–150)
VIT B12 SERPL-MCNC: 1281 PG/ML (ref 211–946)
VLDLC SERPL CALC-MCNC: 13 MG/DL (ref 5–40)

## 2024-06-13 NOTE — PROGRESS NOTES
The ABCs of the Annual Wellness Visit  Subsequent Medicare Wellness Visit    Subjective    Chantale Reynolds is a 24 y.o. female who presents for a Subsequent Medicare Wellness Visit.    The following portions of the patient's history were reviewed and   updated as appropriate: allergies, current medications, past family history, past medical history, past social history, past surgical history, and problem list.    Compared to one year ago, the patient feels her physical   health is worse.    Compared to one year ago, the patient feels her mental   health is the same.    Recent Hospitalizations:  She was not admitted to the hospital during the last year.       Current Medical Providers:  Patient Care Team:  Kalpana Patrick APRN as PCP - General (Family Medicine)  Angel Payan MD as Consulting Physician (Pediatric Cardiology)  Alice Sandy MD as Consulting Physician (Pediatric Endocrinology)  Augustin Hoang MD as Surgeon (Pediatric Orthopedic Surgery)  Maddi Granados MD (Sleep Medicine)  Rodney Anderson MD as Consulting Physician (Allergy)  Howie Farai MD as Gynecologist (Obstetrics and Gynecology)    Outpatient Medications Prior to Visit   Medication Sig Dispense Refill    albuterol (ACCUNEB) 1.25 MG/3ML nebulizer solution Take 3 mL by nebulization Every 6 (Six) Hours As Needed for Wheezing. 1 each 12    albuterol (PROVENTIL) (2.5 MG/3ML) 0.083% nebulizer solution Take 2.5 mg by nebulization Every 4 (Four) Hours As Needed for Wheezing or Shortness of Air. 30 mL 3    ALLERGY SERUM INJECTION Inject  under the skin 1 (One) Time.      Ascorbic Acid 500 MG chewable tablet Chew 1,000 mg.      Azelastine HCl 137 MCG/SPRAY solution INSTILL 1-2 SPRAYS IN EACH NOSTRIL TWICE DAILY AS NEEDED      B Complex Vitamins (VITAMIN B COMPLEX PO) Take  by mouth.      Boric Acid powder       cetirizine (zyrTEC) 10 MG tablet TAKE 1 TABLET BY MOUTH EVERY DAY 30 tablet 5    Cholecalciferol (VITAMIN D)  2000 UNITS tablet Take 1 tablet by mouth Daily.      CINNAMON PO Take  by mouth.      CRANBERRY EXTRACT PO Take  by mouth.      docusate sodium (COLACE) 100 MG capsule TAKE 1 CAPSULE BY MOUTH EVERY NIGHT AT BEDTIME. 90 capsule 3    Emollient (AQUAPHOR ADVANCED THERAPY EX) Apply  topically.      Flaxseed, Linseed, (FLAX SEED OIL PO) Take  by mouth.      ketoconazole (NIZORAL) 2 % shampoo       levothyroxine (SYNTHROID, LEVOTHROID) 88 MCG tablet Take 1 tablet by mouth Daily.      montelukast (SINGULAIR) 10 MG tablet Take 1 tablet by mouth Daily.  11    mupirocin (BACTROBAN) 2 % ointment APPLY TWICE DAILY TO BOILS AND CYSTS IN GROIN REGION      NON FORMULARY 500 mcg. Garden of Life b12 spray      olopatadine (PATADAY) 0.2 % solution ophthalmic solution INSTILL 1 DROP INTO BOTH EYES EVERY DAY AS NEEDED      polyethylene glycol (MIRALAX) 17 GM/SCOOP powder DISSOLVE 17 GRAMS INTO WATER AND DRINK BY MOUTH EVERY  g 1    Probiotic Product (PROBIOTIC-10 PO) Take  by mouth.      Siladryl Allergy 12.5 MG/5ML liquid TAKE 10 MLS BY MOUTH EVERY 4 HOURS AS NEEDED      tobramycin 0.3 % solution ophthalmic solution PLEASE SEE ATTACHED FOR DETAILED DIRECTIONS      tobramycin-dexamethasone (TOBRADEX) 0.3-0.1 % ophthalmic suspension L ear 3 mL 0    vitamin C (ASCORBIC ACID) 500 MG tablet Take 2 tablets by mouth Daily.      Zinc 22.5 MG tablet Take 22.5 mg by mouth.       No facility-administered medications prior to visit.       No opioid medication identified on active medication list. I have reviewed chart for other potential  high risk medication/s and harmful drug interactions in the elderly.        Aspirin is not on active medication list.  Aspirin use is not indicated based on review of current medical condition/s. Risk of harm outweighs potential benefits.  .    Patient Active Problem List   Diagnosis    Down syndrome    Injury of index finger    Knee pain    ENRIQUE (obstructive sleep apnea)    Pain in joint involving pelvic  "region and thigh    Patellar subluxation    Patellofemoral pain syndrome of right knee    Pes planovalgus    Intact hymenal ring    History of hysterectomy, supracervical    Astigmatism    Constipation    Hashimoto's thyroiditis    Hearing deficit, unspecified laterality    Medial tibial stress syndrome, right, initial encounter    Primary hypothyroidism    S/P complete atrioventricular canal repair    Seasonal allergies    Snoring    Vitamin D deficiency    Complete trisomy 21 syndrome    COVID-19     Advance Care Planning   Advance Care Planning     Advance Directive is not on file.  ACP discussion was held with the patient during this visit. Patient does not have an advance directive, information provided.     Objective    Vitals:    24 1002   BP: 112/82   Pulse: 61   Resp: 18   SpO2: 100%   Weight: 54.4 kg (120 lb)   Height: 162.6 cm (64\")     Estimated body mass index is 20.6 kg/m² as calculated from the following:    Height as of this encounter: 162.6 cm (64\").    Weight as of this encounter: 54.4 kg (120 lb).    BMI is within normal parameters. No other follow-up for BMI required.      Does the patient have evidence of cognitive impairment?  Down syndrome          HEALTH RISK ASSESSMENT    Smoking Status:  Social History     Tobacco Use   Smoking Status Never   Smokeless Tobacco Never     Alcohol Consumption:  Social History     Substance and Sexual Activity   Alcohol Use No     Fall Risk Screen:    STEADI Fall Risk Assessment was completed, and patient is at LOW risk for falls.Assessment completed on:2024    Depression Screenin/13/2024    10:09 AM   PHQ-2/PHQ-9 Depression Screening   Little Interest or Pleasure in Doing Things 0-->not at all   Feeling Down, Depressed or Hopeless 0-->not at all   PHQ-9: Brief Depression Severity Measure Score 0       Health Habits and Functional and Cognitive Screenin/13/2024    10:02 AM   Functional & Cognitive Status   Do you have difficulty " preparing food and eating? No   Do you have difficulty bathing yourself, getting dressed or grooming yourself? No   Do you have difficulty using the toilet? No   Do you have difficulty moving around from place to place? No   Do you have trouble with steps or getting out of a bed or a chair? No   Current Diet Well Balanced Diet   Dental Exam Up to date   Eye Exam Up to date   Exercise (times per week) 7 times per week   Current Exercises Include Dancing   Do you need help using the phone?  No   Are you deaf or do you have serious difficulty hearing?  Yes   Do you need help to go to places out of walking distance? Yes   Do you need help shopping? Yes   Do you need help preparing meals?  Yes   Do you need help with housework?  Yes   Do you need help with laundry? Yes   Do you need help taking your medications? Yes   Do you need help managing money? Yes   Do you ever drive or ride in a car without wearing a seat belt? Yes   Have you felt unusual stress, anger or loneliness in the last month? No   Who do you live with? Other   If you need help, do you have trouble finding someone available to you? No   Have you been bothered in the last four weeks by sexual problems? No   Do you have difficulty concentrating, remembering or making decisions? No       Age-appropriate Screening Schedule:  Refer to the list below for future screening recommendations based on patient's age, sex and/or medical conditions. Orders for these recommended tests are listed in the plan section. The patient has been provided with a written plan.    Health Maintenance   Topic Date Due    HPV VACCINES (1 - 3-dose series) Never done    PAP SMEAR  Never done    COVID-19 Vaccine (1 - 2023-24 season) Never done    INFLUENZA VACCINE  08/01/2024    ANNUAL WELLNESS VISIT  06/13/2025    TDAP/TD VACCINES (3 - Td or Tdap) 06/12/2033    HEPATITIS C SCREENING  Completed    Pneumococcal Vaccine 0-64  Completed                  CMS Preventative Services Quick  Reference  Risk Factors Identified During Encounter  Chronic Pain: Natural history and expected course discussed. Questions answered.  Xrays ordered, ok to take tylenol as directed, declines PT   Dental Screening Recommended  Vision Screening Recommended  The above risks/problems have been discussed with the patient.  Pertinent information has been shared with the patient in the After Visit Summary.  An After Visit Summary and PPPS were made available to the patient.    Follow Up:   Next Medicare Wellness visit to be scheduled in 1 year.       Additional E&M Note during same encounter follows:  Patient has multiple medical problems which are significant and separately identifiable that require additional work above and beyond the Medicare Wellness Visit.      Chief Complaint  Annual Exam    Subjective        HPI  Chantale Reynolds is also being seen today for low Back pain , Left shoulder pain, shin pain. She has not had fall or injury. She works at [employer]. No recent lifting or straining. Pt has seen PT from time to time for core strengthening. Mom ensures she wears supportive shoes at work and when dancing.     Some acute on chronic lower abdominal pain , bloating and weight gain. She saw GYN Dr Faria and they wanted up to follow up here. She has had constipation and urinary retention in 2018.Denies these symptoms now.  She is voiding regularly. Has BM most nights, soft, no straining. Denies nausea, vomiting. Mom has noted lower abd weight gain, no other changes to diet, lifestyle to account for this belly weight. History of partial hysterectomy.     History of central sleep apnea x years. She has passed sleep study with weight loss (was 106 lbs after high school). Mom denies snoring. She sees cardio, heart doctor would like her to work on weight loss.     Review of Systems   Constitutional: Negative.  Negative for activity change, fatigue and fever.   HENT: Negative.  Negative for congestion.    Respiratory:   "Negative for cough, shortness of breath and wheezing.    Cardiovascular:  Negative for chest pain, palpitations and leg swelling.   Gastrointestinal:  Positive for abdominal pain. Negative for constipation, diarrhea, nausea and vomiting.   Endocrine: Negative for polydipsia, polyphagia and polyuria.   Genitourinary:  Negative for dysuria and hematuria.   Musculoskeletal:  Positive for arthralgias (L shoulder) and back pain.   Skin:  Negative for wound.   Allergic/Immunologic: Negative for environmental allergies.   Neurological:  Negative for seizures and syncope.   Hematological:  Does not bruise/bleed easily.   Psychiatric/Behavioral:  Negative for dysphoric mood and sleep disturbance.        Objective   Vital Signs:  /82   Pulse 61   Resp 18   Ht 162.6 cm (64\")   Wt 54.4 kg (120 lb)   SpO2 100%   BMI 20.60 kg/m²     Physical Exam  Vitals reviewed.   Constitutional:       Appearance: Normal appearance.   HENT:      Mouth/Throat:      Mouth: Mucous membranes are moist.   Cardiovascular:      Rate and Rhythm: Normal rate and regular rhythm.      Pulses: Normal pulses.      Heart sounds: Normal heart sounds.   Pulmonary:      Effort: Pulmonary effort is normal.      Breath sounds: Normal breath sounds.   Abdominal:      General: Bowel sounds are normal.      Tenderness: There is abdominal tenderness.   Musculoskeletal:         General: Tenderness present. Normal range of motion.   Skin:     General: Skin is warm.   Neurological:      General: No focal deficit present.      Mental Status: She is alert.                         Assessment and Plan   Diagnoses and all orders for this visit:    1. Encounter for subsequent annual wellness visit (AWV) in Medicare patient (Primary)    2. Abdominal bloating  -     CBC & Differential  -     Comprehensive metabolic panel  -     Lipid panel  -     Hemoglobin A1c  -     Ambulatory Referral to Physical Therapy    3. Chronic left shoulder pain  -     XR Shoulder 2+ View " Left; Future  -     Ambulatory Referral to Physical Therapy    4. Lower abdominal pain  -     XR Abdomen KUB; Future  -     Ambulatory Referral to Physical Therapy    5. Chronic fatigue  -     CBC & Differential  -     Comprehensive metabolic panel  -     Lipid panel  -     Iron  -     Vitamin B12 and Folate  -     Ferritin  -     Reticulocytes  -     Vitamin D 25 hydroxy    6. Abnormal finding of blood chemistry, unspecified  -     Lipid panel  -     Hemoglobin A1c  -     Iron  -     Ferritin    7. Vitamin D deficiency, unspecified  -     Vitamin D 25 hydroxy      Preventative care- Follow heart healthy diet, drink water, walk daily. Wear seatbelts, wear helmets, wear sunscreens. Follow CDC guidelines for covid and flu     Order L shoulder and abd xray, order PT for core strengthening and pain. Consider topical rubs, heat, ice, tylenol as needed. Stretching daily, good supportive shoes.     Lower abd pain- cw yogurt, probiotics, fiber. Limit sweets. Drink mostly water. Cw exercise, seeing PT, check xray . She denies UTI sx.     Mom is asking for labs, vit d , anemia panel and routine labs today        Follow Up   Return in about 1 year (around 6/13/2025) for Medicare Wellness.  Patient was given instructions and counseling regarding her condition or for health maintenance advice. Please see specific information pulled into the AVS if appropriate.

## 2024-06-14 ENCOUNTER — HOSPITAL ENCOUNTER (OUTPATIENT)
Dept: GENERAL RADIOLOGY | Facility: HOSPITAL | Age: 24
Discharge: HOME | End: 2024-06-14
Payer: MEDICARE

## 2024-06-14 DIAGNOSIS — M25.512 CHRONIC LEFT SHOULDER PAIN: ICD-10-CM

## 2024-06-14 DIAGNOSIS — G89.29 CHRONIC LEFT SHOULDER PAIN: ICD-10-CM

## 2024-06-14 DIAGNOSIS — R10.30 LOWER ABDOMINAL PAIN: ICD-10-CM

## 2024-06-14 LAB
BASOPHILS # BLD AUTO: 0.06 10*3/MM3 (ref 0–0.2)
BASOPHILS NFR BLD AUTO: 1.2 % (ref 0–1.5)
EOSINOPHIL # BLD AUTO: 0.02 10*3/MM3 (ref 0–0.4)
EOSINOPHIL NFR BLD AUTO: 0.4 % (ref 0.3–6.2)
ERYTHROCYTE [DISTWIDTH] IN BLOOD BY AUTOMATED COUNT: 13 % (ref 12.3–15.4)
HCT VFR BLD AUTO: 44.7 % (ref 34–46.6)
HGB BLD-MCNC: 14.4 G/DL (ref 12–15.9)
IMM GRANULOCYTES # BLD AUTO: 0.02 10*3/MM3 (ref 0–0.05)
IMM GRANULOCYTES NFR BLD AUTO: 0.4 % (ref 0–0.5)
IRON SERPL-MCNC: 128 MCG/DL (ref 37–145)
LYMPHOCYTES # BLD AUTO: 1.02 10*3/MM3 (ref 0.7–3.1)
LYMPHOCYTES NFR BLD AUTO: 20.2 % (ref 19.6–45.3)
MCH RBC QN AUTO: 32.1 PG (ref 26.6–33)
MCHC RBC AUTO-ENTMCNC: 32.2 G/DL (ref 31.5–35.7)
MCV RBC AUTO: 99.8 FL (ref 79–97)
MONOCYTES # BLD AUTO: 0.35 10*3/MM3 (ref 0.1–0.9)
MONOCYTES NFR BLD AUTO: 6.9 % (ref 5–12)
NEUTROPHILS # BLD AUTO: 3.59 10*3/MM3 (ref 1.7–7)
NEUTROPHILS NFR BLD AUTO: 70.9 % (ref 42.7–76)
NRBC BLD AUTO-RTO: 0 /100 WBC (ref 0–0.2)
PLATELET # BLD AUTO: 194 10*3/MM3 (ref 140–450)
RBC # BLD AUTO: 4.48 10*6/MM3 (ref 3.77–5.28)
RETICS/RBC NFR AUTO: 1.71 % (ref 0.7–1.9)
WBC # BLD AUTO: 5.06 10*3/MM3 (ref 3.4–10.8)

## 2024-06-14 PROCEDURE — 73030 X-RAY EXAM OF SHOULDER: CPT

## 2024-06-14 PROCEDURE — 74018 RADEX ABDOMEN 1 VIEW: CPT

## 2024-06-17 DIAGNOSIS — M25.512 CHRONIC LEFT SHOULDER PAIN: Primary | ICD-10-CM

## 2024-06-17 DIAGNOSIS — G89.29 CHRONIC LEFT SHOULDER PAIN: Primary | ICD-10-CM

## 2024-07-15 ENCOUNTER — OFFICE VISIT (OUTPATIENT)
Dept: ORTHOPEDIC SURGERY | Facility: CLINIC | Age: 24
End: 2024-07-15
Payer: MEDICARE

## 2024-07-15 VITALS — HEIGHT: 55 IN | BODY MASS INDEX: 28.42 KG/M2 | WEIGHT: 122.8 LBS | TEMPERATURE: 98.2 F

## 2024-07-15 DIAGNOSIS — M75.42 IMPINGEMENT SYNDROME OF LEFT SHOULDER: Primary | ICD-10-CM

## 2024-07-15 PROCEDURE — 1159F MED LIST DOCD IN RCRD: CPT | Performed by: ORTHOPAEDIC SURGERY

## 2024-07-15 PROCEDURE — 1160F RVW MEDS BY RX/DR IN RCRD: CPT | Performed by: ORTHOPAEDIC SURGERY

## 2024-07-15 PROCEDURE — 99203 OFFICE O/P NEW LOW 30 MIN: CPT | Performed by: ORTHOPAEDIC SURGERY

## 2024-07-15 NOTE — PROGRESS NOTES
New Shoulder      Patient: Chantale Reynolds        YOB: 2000    Medical Record Number: [id number]        Chief Complaints: Left shoulder pain      History of Present Illness: This is a 24-year-old female who is right-hand-dominant presents left shoulder pain this been ongoing for couple years she does have night pain no history injury that she can recall she does have some night pain her pain is 8 out of 10 occasionally past medical history is unremarkable other than those issues listed below and reviewed by me      Allergies:   Allergies   Allergen Reactions    Latex Other (See Comments)     Other reaction(s): Other (See Comments)   Precautions      Precautions      Other reaction(s): Other (See Comments)   Precautions   Other reaction(s): Unknown cause   Other reaction(s)   Other reaction(s)    Corn Other (See Comments)    Corn Oil Other (See Comments)     Other reaction(s): Other (See Comments)   Other reaction(s): Other (See Comments)    Egg-Derived Products Other (See Comments)     Other reaction(s): Other (See Comments)   Other reaction(s): Other (See Comments)    Octacosanol Other (See Comments)     Other reaction(s): Other (See Comments)   Other reaction(s): Other (See Comments)    Seasonal Ic [Cholestatin] Other (See Comments)       Medications:   Home Medications:  Current Outpatient Medications on File Prior to Visit   Medication Sig    albuterol (ACCUNEB) 1.25 MG/3ML nebulizer solution Take 3 mL by nebulization Every 6 (Six) Hours As Needed for Wheezing.    albuterol (PROVENTIL) (2.5 MG/3ML) 0.083% nebulizer solution Take 2.5 mg by nebulization Every 4 (Four) Hours As Needed for Wheezing or Shortness of Air.    ALLERGY SERUM INJECTION Inject  under the skin 1 (One) Time.    Ascorbic Acid 500 MG chewable tablet Chew 1,000 mg.    Azelastine HCl 137 MCG/SPRAY solution INSTILL 1-2 SPRAYS IN EACH NOSTRIL TWICE DAILY AS NEEDED    B Complex Vitamins (VITAMIN B COMPLEX PO) Take  by mouth.     Boric Acid powder     cetirizine (zyrTEC) 10 MG tablet TAKE 1 TABLET BY MOUTH EVERY DAY    Cholecalciferol (VITAMIN D) 2000 UNITS tablet Take 1 tablet by mouth Daily.    CINNAMON PO Take  by mouth.    CRANBERRY EXTRACT PO Take  by mouth.    docusate sodium (COLACE) 100 MG capsule TAKE 1 CAPSULE BY MOUTH EVERY NIGHT AT BEDTIME.    Emollient (AQUAPHOR ADVANCED THERAPY EX) Apply  topically.    Flaxseed, Linseed, (FLAX SEED OIL PO) Take  by mouth.    ketoconazole (NIZORAL) 2 % shampoo     levothyroxine (SYNTHROID, LEVOTHROID) 88 MCG tablet Take 1 tablet by mouth Daily.    montelukast (SINGULAIR) 10 MG tablet Take 1 tablet by mouth Daily.    mupirocin (BACTROBAN) 2 % ointment APPLY TWICE DAILY TO BOILS AND CYSTS IN GROIN REGION    NON FORMULARY 500 mcg. Garden of Life b12 spray    olopatadine (PATADAY) 0.2 % solution ophthalmic solution INSTILL 1 DROP INTO BOTH EYES EVERY DAY AS NEEDED    polyethylene glycol (MIRALAX) 17 GM/SCOOP powder DISSOLVE 17 GRAMS INTO WATER AND DRINK BY MOUTH EVERY DAY    Probiotic Product (PROBIOTIC-10 PO) Take  by mouth.    Siladryl Allergy 12.5 MG/5ML liquid TAKE 10 MLS BY MOUTH EVERY 4 HOURS AS NEEDED    tobramycin 0.3 % solution ophthalmic solution PLEASE SEE ATTACHED FOR DETAILED DIRECTIONS    tobramycin-dexamethasone (TOBRADEX) 0.3-0.1 % ophthalmic suspension L ear    vitamin C (ASCORBIC ACID) 500 MG tablet Take 2 tablets by mouth Daily.    Zinc 22.5 MG tablet Take 22.5 mg by mouth.     No current facility-administered medications on file prior to visit.     Current Medications:  Scheduled Meds:  Continuous Infusions:No current facility-administered medications for this visit.    PRN Meds:.    Past Medical History:   Diagnosis Date    Acid reflux     Anal fissure     ASD (atrial septal defect)     Asthma     MILD    AVD (aortic valve disease)     Congestive heart failure     Down syndrome     Dysmenorrhea     Irregular heart beat     Knee pain     PAULY    Pain in both feet     Sleep apnea      WEARS CPAP    Thyroid disease     Urinary incontinence     Uses hearing aid         Past Surgical History:   Procedure Laterality Date    CARDIAC VALVE SURGERY      EAR GRAFT BONE Left     HYSTERECTOMY SUPRACERVICAL N/A 06/01/2016    Procedure: LAPAROSCOPIC ASSISTED SUPRACERVICAL HYSTERECTOMY WITH BILATERAL SALPINGECTOMY;  Surgeon: Howie Faria MD;  [place] [facility] [place] [facility] [place]:     TONSILLECTOMY AND ADENOIDECTOMY      TYMPANOPLASTY W/ MASTOIDECTOMY      X3        Social History     Occupational History    Not on file   Tobacco Use    Smoking status: Never    Smokeless tobacco: Never   Vaping Use    Vaping status: Never Used   Substance and Sexual Activity    Alcohol use: No    Drug use: No    Sexual activity: Never      Social History     Social History Narrative    Not on file        Family History   Problem Relation Age of Onset    No Known Problems Father     Autoimmune disease Mother     Breast cancer Maternal Grandmother 74    Ovarian cancer Neg Hx     Uterine cancer Neg Hx     Colon cancer Neg Hx              Review of Systems:     Review of Systems      Physical Exam: 24 y.o. female  General Appearance:    Alert, cooperative, in no acute distress                 There were no vitals filed for this visit.   Patient is alert and read ×3 no acute distress appears her above-listed at height weight and age.  Affect is normal respiratory rate is normal unlabored. Heart rate regular rate rhythm, sclera, dentition and hearing are normal for the purpose of this exam.    Ortho Exam  Physical exam of the left shoulder reveals no overlying skin changes no lymphedema no lymphadenopathy.  Patient has active flexion 180 with mild symptoms abduction is similar external rotation is to 50 and internal rotation to the upper lumbar spine with mild symptoms.  Patient has good rotator cuff strength 4+ over 5 with isometric strength testing with pain.  Patient has a positive impingement and a positive Mar sign.   Patient has good cervical range of motion which is full and asymptomatic no radicular symptoms.  Patient has a normal elbow exam.  Good distal pulses are presentPatient has pain with overhead activity and a positive Neer sign and a positive empty can sign  They have a positive drop arm any definitive painful arc   Procedures          Radiology:   AP, Scapular Y and Axillary Lateral of the left shoulder were d/reviewed to evauate shoulder pain.  She has some acromioclavicular arthritis otherwise no acute bony pathology  Imaging Results (Most Recent)       None          Assessment/Plan: Is a 24-year-old patient with Down syndrome who is a very good historian who has left shoulder pain I think some of this is impingement she does use wii dance a lot for exercise and that might be irritating her shoulder.  We talked about options including injections I think we will start with the most conservative thing first which would be physical therapy if she fails to improve with that might consider an injection I will see her back in 4 weeks

## 2024-07-16 ENCOUNTER — PATIENT ROUNDING (BHMG ONLY) (OUTPATIENT)
Dept: ORTHOPEDIC SURGERY | Facility: CLINIC | Age: 24
End: 2024-07-16
Payer: MEDICARE

## 2024-07-16 NOTE — PROGRESS NOTES
A [facility] Message has been [place] the patient [place] [facility] [place]

## 2024-10-11 NOTE — PROGRESS NOTES
Patient: Chantale Reynolds  YOB: 2000  Date of Service: 10/11/2024    Chief Complaints: Left shoulder pain    Subjective:    History of Present Illness: Pt is seen in the office today with complaints of left shoulder pain I last saw her in July thought this was impingement we started into physical therapy she feels like she is stronger but still hurting we talked about doing an injection which she is willing to do        Allergies:   Allergies   Allergen Reactions    Latex Other (See Comments)     Other reaction(s): Other (See Comments)   Precautions      Precautions      Other reaction(s): Other (See Comments)   Precautions   Other reaction(s): Unknown cause   Other reaction(s)   Other reaction(s)    Corn Other (See Comments)    Corn Oil Other (See Comments)     Other reaction(s): Other (See Comments)   Other reaction(s): Other (See Comments)    Egg-Derived Products Other (See Comments)     Other reaction(s): Other (See Comments)   Other reaction(s): Other (See Comments)    Octacosanol Other (See Comments)     Other reaction(s): Other (See Comments)   Other reaction(s): Other (See Comments)    Seasonal Ic [Cholestatin] Other (See Comments)       Medications:   Home Medications:  Current Outpatient Medications on File Prior to Visit   Medication Sig    albuterol (ACCUNEB) 1.25 MG/3ML nebulizer solution Take 3 mL by nebulization Every 6 (Six) Hours As Needed for Wheezing.    albuterol (PROVENTIL) (2.5 MG/3ML) 0.083% nebulizer solution Take 2.5 mg by nebulization Every 4 (Four) Hours As Needed for Wheezing or Shortness of Air.    ALLERGY SERUM INJECTION Inject  under the skin 1 (One) Time.    Ascorbic Acid 500 MG chewable tablet Chew 1,000 mg.    Azelastine HCl 137 MCG/SPRAY solution INSTILL 1-2 SPRAYS IN EACH NOSTRIL TWICE DAILY AS NEEDED    B Complex Vitamins (VITAMIN B COMPLEX PO) Take  by mouth.    Boric Acid powder     cetirizine (zyrTEC) 10 MG tablet TAKE 1 TABLET BY MOUTH EVERY DAY     Cholecalciferol (VITAMIN D) 2000 UNITS tablet Take 1 tablet by mouth Daily.    CINNAMON PO Take  by mouth.    CRANBERRY EXTRACT PO Take  by mouth.    docusate sodium (COLACE) 100 MG capsule TAKE 1 CAPSULE BY MOUTH EVERY NIGHT AT BEDTIME.    Emollient (AQUAPHOR ADVANCED THERAPY EX) Apply  topically.    Flaxseed, Linseed, (FLAX SEED OIL PO) Take  by mouth.    ketoconazole (NIZORAL) 2 % shampoo     levothyroxine (SYNTHROID, LEVOTHROID) 88 MCG tablet Take 1 tablet by mouth Daily.    montelukast (SINGULAIR) 10 MG tablet Take 1 tablet by mouth Daily.    mupirocin (BACTROBAN) 2 % ointment APPLY TWICE DAILY TO BOILS AND CYSTS IN GROIN REGION    NON FORMULARY 500 mcg. Garden of Life b12 spray    olopatadine (PATADAY) 0.2 % solution ophthalmic solution INSTILL 1 DROP INTO BOTH EYES EVERY DAY AS NEEDED (Patient not taking: Reported on 7/15/2024)    polyethylene glycol (MIRALAX) 17 GM/SCOOP powder DISSOLVE 17 GRAMS INTO WATER AND DRINK BY MOUTH EVERY DAY    Probiotic Product (PROBIOTIC-10 PO) Take  by mouth.    Siladryl Allergy 12.5 MG/5ML liquid TAKE 10 MLS BY MOUTH EVERY 4 HOURS AS NEEDED    tobramycin 0.3 % solution ophthalmic solution PLEASE SEE ATTACHED FOR DETAILED DIRECTIONS (Patient not taking: Reported on 7/15/2024)    tobramycin-dexamethasone (TOBRADEX) 0.3-0.1 % ophthalmic suspension L ear (Patient not taking: Reported on 7/15/2024)    vitamin C (ASCORBIC ACID) 500 MG tablet Take 2 tablets by mouth Daily.    Zinc 22.5 MG tablet Take 22.5 mg by mouth.     No current facility-administered medications on file prior to visit.     Current Medications:  Scheduled Meds:  Continuous Infusions:No current facility-administered medications for this visit.    PRN Meds:.    I have reviewed the patient's medical history in detail and updated the computerized patient record.  Review and summarization of old records include:    Past Medical History:   Diagnosis Date    Acid reflux     Anal fissure     ASD (atrial septal defect)      Asthma     MILD    AVD (aortic valve disease)     Congestive heart failure     Down syndrome     Dysmenorrhea     Irregular heart beat     Knee pain     PAULY    Pain in both feet     Sleep apnea     WEARS CPAP    Thyroid disease     Urinary incontinence     Uses hearing aid         Past Surgical History:   Procedure Laterality Date    CARDIAC VALVE SURGERY      EAR GRAFT BONE Left     HYSTERECTOMY SUPRACERVICAL N/A 06/01/2016    Procedure: LAPAROSCOPIC ASSISTED SUPRACERVICAL HYSTERECTOMY WITH BILATERAL SALPINGECTOMY;  Surgeon: Howie Faria MD;  [place] [facility] [place] [facility] [place]:     TONSILLECTOMY AND ADENOIDECTOMY      TYMPANOPLASTY W/ MASTOIDECTOMY      X3        Social History     Occupational History    Not on file   Tobacco Use    Smoking status: Never    Smokeless tobacco: Never   Vaping Use    Vaping status: Never Used   Substance and Sexual Activity    Alcohol use: No    Drug use: No    Sexual activity: Never      Social History     Social History Narrative    Not on file        Family History   Problem Relation Age of Onset    No Known Problems Father     Autoimmune disease Mother     Breast cancer Maternal Grandmother 74    Ovarian cancer Neg Hx     Uterine cancer Neg Hx     Colon cancer Neg Hx        ROS: 14 point review of systems was performed and was negative except for documented findings in HPI and today's encounter.     Allergies:   Allergies   Allergen Reactions    Latex Other (See Comments)     Other reaction(s): Other (See Comments)   Precautions      Precautions      Other reaction(s): Other (See Comments)   Precautions   Other reaction(s): Unknown cause   Other reaction(s)   Other reaction(s)    Corn Other (See Comments)    Corn Oil Other (See Comments)     Other reaction(s): Other (See Comments)   Other reaction(s): Other (See Comments)    Egg-Derived Products Other (See Comments)     Other reaction(s): Other (See Comments)   Other reaction(s): Other (See Comments)    Octacosanol Other (See  Comments)     Other reaction(s): Other (See Comments)   Other reaction(s): Other (See Comments)    Seasonal Ic [Cholestatin] Other (See Comments)     Constitutional:  Denies fever, shaking or chills   Eyes:  Denies change in visual acuity   HENT:  Denies nasal congestion or sore throat   Respiratory:  Denies cough or shortness of breath   Cardiovascular:  Denies chest pain or severe LE edema   GI:  Denies abdominal pain, nausea, vomiting, bloody stools or diarrhea   Musculoskeletal:  Numbness, tingling, or loss of motor function only as noted above in history of present illness.  : Denies painful urination or hematuria  Integument:  Denies rash, lesion or ulceration   Neurologic:  Denies headache or focal weakness  Endocrine:  Denies lymphadenopathy  Psych:  Denies confusion or change in mental status   Hem:  Denies active bleeding      Physical Exam: 24 y.o. female  Wt Readings from Last 3 Encounters:   07/15/24 55.7 kg (122 lb 12.8 oz)   06/13/24 54.4 kg (120 lb)   01/02/24 54.4 kg (120 lb)       There is no height or weight on file to calculate BMI.    There were no vitals filed for this visit.  Vital signs reviewed.   General Appearance:    Alert, cooperative, in no acute distress                    Ortho exam    Physical exam of the left shoulder reveals no overlying skin changes no lymphedema no lymphadenopathy.  Patient has active flexion 180 with mild symptoms abduction is similar external rotation is to 50 and internal rotation to the upper lumbar spine with mild symptoms.  Patient has good rotator cuff strength 4+ over 5 with isometric strength testing with pain.  Patient has a positive impingement and a positive Mar sign.  Patient has good cervical range of motion which is full and asymptomatic no radicular symptoms.  Patient has a normal elbow exam.  Good distal pulses are presentPatient has pain with overhead activity and a positive Neer sign and a positive empty can sign  They have a positive  drop arm any definitive painful arc              Assessment: Left shoulder pain I think this is impingement I think she benefit from an injection which she was agreeable to do    Plan: As above  Follow up as indicated.  Ice, elevate, and rest as needed.  Discussed conservative measures of pain control including ice, bracing.    Kim Lee M.D.    Large Joint Arthrocentesis: L subacromial bursa  Date/Time: 10/15/2024 3:56 PM  Consent given by: patient  Site marked: site marked  Timeout: Immediately prior to procedure a time out was called to verify the correct patient, procedure, equipment, support staff and site/side marked as required   Supporting Documentation  Indications: pain   Procedure Details  Location: shoulder - L subacromial bursa  Preparation: Patient was prepped and draped in the usual sterile fashion  Needle gauge: 21G.  Approach: posterior  Medications administered: 80 mg methylPREDNISolone acetate 80 MG/ML; 2 mL lidocaine PF 1% 1 %  Patient tolerance: patient tolerated the procedure well with no immediate complications

## 2024-10-15 ENCOUNTER — OFFICE VISIT (OUTPATIENT)
Dept: ORTHOPEDIC SURGERY | Facility: CLINIC | Age: 24
End: 2024-10-15
Payer: MEDICARE

## 2024-10-15 VITALS — TEMPERATURE: 97.8 F | HEIGHT: 55 IN | WEIGHT: 119.9 LBS | BODY MASS INDEX: 27.75 KG/M2

## 2024-10-15 DIAGNOSIS — M75.42 IMPINGEMENT SYNDROME OF LEFT SHOULDER: Primary | ICD-10-CM

## 2024-10-15 RX ORDER — BIOTIN 5 MG
TABLET ORAL
COMMUNITY

## 2024-10-15 RX ADMIN — METHYLPREDNISOLONE ACETATE 80 MG: 80 INJECTION, SUSPENSION INTRA-ARTICULAR; INTRALESIONAL; INTRAMUSCULAR; SOFT TISSUE at 15:56

## 2024-10-15 RX ADMIN — LIDOCAINE HYDROCHLORIDE 2 ML: 10 INJECTION, SOLUTION EPIDURAL; INFILTRATION; INTRACAUDAL; PERINEURAL at 15:56

## 2024-10-16 RX ORDER — METHYLPREDNISOLONE ACETATE 80 MG/ML
80 INJECTION, SUSPENSION INTRA-ARTICULAR; INTRALESIONAL; INTRAMUSCULAR; SOFT TISSUE
Status: COMPLETED | OUTPATIENT
Start: 2024-10-15 | End: 2024-10-15

## 2024-10-16 RX ORDER — LIDOCAINE HYDROCHLORIDE 10 MG/ML
2 INJECTION, SOLUTION EPIDURAL; INFILTRATION; INTRACAUDAL; PERINEURAL
Status: COMPLETED | OUTPATIENT
Start: 2024-10-15 | End: 2024-10-15

## 2024-10-22 ENCOUNTER — OFFICE VISIT (OUTPATIENT)
Dept: ORTHOPEDIC SURGERY | Facility: CLINIC | Age: 24
End: 2024-10-22
Payer: MEDICARE

## 2024-10-22 ENCOUNTER — TELEPHONE (OUTPATIENT)
Dept: ORTHOPEDIC SURGERY | Facility: CLINIC | Age: 24
End: 2024-10-22

## 2024-10-22 VITALS — BODY MASS INDEX: 27.17 KG/M2 | HEIGHT: 55 IN | WEIGHT: 117.4 LBS | TEMPERATURE: 99.5 F

## 2024-10-22 DIAGNOSIS — M54.50 CHRONIC MIDLINE LOW BACK PAIN WITHOUT SCIATICA: Primary | ICD-10-CM

## 2024-10-22 DIAGNOSIS — G89.29 CHRONIC MIDLINE LOW BACK PAIN WITHOUT SCIATICA: Primary | ICD-10-CM

## 2024-10-22 PROCEDURE — 1160F RVW MEDS BY RX/DR IN RCRD: CPT | Performed by: NURSE PRACTITIONER

## 2024-10-22 PROCEDURE — 99214 OFFICE O/P EST MOD 30 MIN: CPT | Performed by: NURSE PRACTITIONER

## 2024-10-22 PROCEDURE — 1159F MED LIST DOCD IN RCRD: CPT | Performed by: NURSE PRACTITIONER

## 2024-10-22 RX ORDER — LIDOCAINE 50 MG/G
1 PATCH TOPICAL DAILY
Qty: 30 PATCH | Refills: 5 | Status: SHIPPED | OUTPATIENT
Start: 2024-10-22

## 2024-10-22 NOTE — PROGRESS NOTES
Patient Name: Chantale Reynolds   YOB: 2000  Referring Primary Care Physician: Kalpana Patrick APRN      Chief Complaint:    Chief Complaint   Patient presents with    Lumbar Spine - Initial Evaluation, Pain        Previous Treatment:   [facility] per [facility]    PT for back pain? Yes     MRI:   02/24/2020 - MRI of L-Spine W/O ([facility])     Back Pain  This is a chronic problem. The current episode started more than 1 year ago. The problem occurs intermittently. The problem has been worse since onset. The pain is present in the lumbar spine. The quality of the pain is described as aching. The pain does not radiate. The pain is at a severity of 5/10. The pain is moderate. The symptoms are aggravated by standing. Pertinent negatives include no leg pain, numbness, tingling or weakness. She has tried nothing for the symptoms.        HPI:  Chantale Reynolds is a 24 y.o. female who presents to [facility] for evaluation of neck pain without radicular symptoms.  No injury associated with symptoms.  She has had back pain intermittently for years.  Previously evaluated at [facility] in 2020.  At that point she was referred to physical therapy and had lumbar MRI which showed facet disease to the right L4-5 but more posteriorly.  She is accompanied by her mother today.  Pain is worse with prolonged standing.  She has a difficult time otherwise describing the location or intensity of her pain.  Sitting does alleviate the symptoms.  Her mother expresses that she often sits crosslegged on her bed leaning forward to coloring will do this for hours.  Recently she has been trying to make her color at her desk for more upright position.  She also reports that she sleeps fairly bent over with several pillows.  She has been in therapy recently for the left shoulder.  She saw Dr. Lee for left shoulder pain.  This is an established patient to the practice, new to me.  Prior pertinent records were  reviewed.    PFSH:  See attached    ROS: As per HPI, otherwise negative    Objective:      24 y.o. female  Body mass index is 27.79 kg/m²., 53.3 kg (117 lb 6.4 oz), @HT@  Vitals:    10/22/24 1532   Temp: 99.5 °F (37.5 °C)     Pain Score    10/22/24 1532   PainSc:   5   PainLoc: Back            Spine Musculoskeletal Exam    Gait    Gait is normal.    Inspection    Coronal balance: no imbalance    Sagittal balance: no imbalance    Palpation    Thoracolumbar    Tenderness: none    Right      Muscle tone: normal    Strength    Thoracolumbar    Thoracolumbar motor exam is normal.       Sensory    Thoracolumbar    Thoracolumbar sensation is normal.    Reflexes    Right      Quadriceps: 2/4      Achilles: 2/4     Left      Quadriceps: 2/4      Achilles: 2/4    Special Tests    Thoracolumbar      Right      SLR: no back or leg pain      Left      SLR: no back or leg pain    General      Constitutional: well-developed and well-nourished    Scleral icterus: no    Labored breathing: no    Psychiatric: normal mood and affect and no acute distress    Neurological: alert and oriented x3    Skin: intact        IMAGING:     Indication: pain related symptoms,  Views: 2V AP&LAT lumbar  Findings: No fractures or subluxation, fairly well-maintained disc heights, no acute findings  Comparison views: Lumbar MRI 2020 and [facility] is not available for direct review but radiologist reports extraspinal synovial cyst the 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 Patient will be notified of any pertinent discrepancies.    Assessment:           Diagnoses and all orders for this visit:    1. Chronic midline low back pain without sciatica (Primary)  -     Ambulatory Referral to Physical Therapy for Evaluation & Treatment    Other orders  -     lidocaine (LIDODERM) 5 %; Place 1 patch on the skin as directed by provider Daily. Remove & Discard patch  within 12 hours or as directed by MD  Dispense: 30 patch; Refill: 5  -     Diclofenac Sodium (VOLTAREN) 1 % gel gel; Apply 4 g topically to the appropriate area as directed 4 (Four) Times a Day As Needed (As needed for pain symptoms).  Dispense: 100 g; Refill: 5             Plan:  For her axial low back pain, will initiate physical therapy.  Currently finishing up physical therapy for the left shoulder.  Encourage therapy to teach home exercise program and work on core strength as well as pain relieving techniques.  Discussed a more upright position while she is coloring with a footstool to rest her feet on as she cannot touch the floor with her feet.  Also encouraged use of lidocaine patches on workdays when she has to stand for longer periods.  Could also try Voltaren gel as needed for nighttime back pain.  She has no loss of nerve function on exam today and no radiculopathy to justify updating lumbar MRI which was done in 2020.  If symptoms persist or she develops any radicular complaints, could always get a lumbar MRI at that point but at this time her mother wants to remain conservative with therapy and topical analgesics, which is certainly reasonable and a good start.  For now we will plan on follow-up as needed.    Return if symptoms worsen or fail to improve.    EMR Dragon/Transcription Disclaimer:   Much of this encounter note is an electronic transcription/translation of spoken language to printed text. The electronic translation of spoken language may permit erroneous, or at times, nonsensical words or phrases to be inadvertently transcribed; Although I have reviewed the note for such errors, some may still exist.  Red flags have been discussed at this or previous visits to include but not limited weakness in extremities, worsening pain that does not respond to conservative treatment and bowel or bladder dysfunction. These are reasons to present to ER and patient has been informed.    The diagnosis(es),  natural history, pathophysiology and treatment for diagnosis(es) were discussed. Opportunity given and questions answered. Biomechanics of pertinent body areas discussed.    EXERCISES:  Advice on benefits of, and types of regular/moderate exercise pertaining to diagnosis.  Continue HEP. For back or neck pain, recommend pilates and or yoga as tolerated. Generally it is best to start any new exercise under the guidance of a  or therapist.   MEDICATIONS:  When prescribe, the risks, benefits, warnings,side effects and alternatives of medications discussed. Advised against long term use of narcotics.   PAIN CONTROL:  Cold, heat, OTC lidocaine patches and/or ointment as needed. Avoid direct skin contact with ice. Ice 15-20 minutes 3-4 times daily as needed. For SI joint pain, recommend ice bath in water about 50 degrees for 5 consecutive days, add ice slowly to help with adjustment and may cover with warm towel or robe to help with cold tolerance. If using lidocaine, do not apply heat in conjunction as this can cause a burn.   MEDICAL RECORDS reviewed from other provider(s) for past and current medical history pertinent to this visit..

## 2024-10-22 NOTE — TELEPHONE ENCOUNTER
Hub staff attempted to follow warm transfer process and was unsuccessful     Caller: Micki Reynolds    Relationship to patient: Mother    Best call back number: [phone]    Patient is needing: PT'S MOTHER WANTING TO KNOW IF APPT CAN BE MOVED TODAY TO AROUND 3:15 DUE TO PT'S WORK SCHEDULE. PLEASE ADVISE. PT IS SCHEDULED FOR 2:40.

## 2025-01-13 ENCOUNTER — OFFICE VISIT (OUTPATIENT)
Dept: OBSTETRICS AND GYNECOLOGY | Age: 25
End: 2025-01-13
Payer: MEDICARE

## 2025-01-13 VITALS
BODY MASS INDEX: 28 KG/M2 | SYSTOLIC BLOOD PRESSURE: 116 MMHG | DIASTOLIC BLOOD PRESSURE: 70 MMHG | WEIGHT: 121 LBS | HEIGHT: 55 IN

## 2025-01-13 DIAGNOSIS — Z90.711 HISTORY OF HYSTERECTOMY, SUPRACERVICAL: ICD-10-CM

## 2025-01-13 DIAGNOSIS — Z00.00 ENCOUNTER FOR ANNUAL PHYSICAL EXAM: ICD-10-CM

## 2025-01-13 DIAGNOSIS — Q90.9 DOWN SYNDROME: Primary | ICD-10-CM

## 2025-01-14 NOTE — PROGRESS NOTES
Subjective     Chief Complaint   Patient presents with    Gynecologic Exam     Annual:         History of Present Illness    Wellness exam  Chantale Reynolds is a very pleasant  24 y.o. female who presents for annual exam.  Mammo Exam deferred, Contraception history of hysterectomy, Exercise encouraged  Patient without GYN concerns or complaints she has previously had a laparoscopic supracervical hysterectomy.  She has no vaginal bleeding or discharge she remains not sexually active.  She is accompanied by her mother as usual.  She is up-to-date with wellness labs she has her PCP that she sees regularly..      Obstetric History:  OB History          0    Para   0    Term   0       0    AB   0    Living   0         SAB   0    IAB   0    Ectopic   0    Molar   0    Multiple   0    Live Births   0               Menstrual History:     Patient's last menstrual period was 2016.       Sexual History:       Past Medical History:   Diagnosis Date    Acid reflux     Anal fissure     ASD (atrial septal defect)     Asthma     MILD    AVD (aortic valve disease)     Congestive heart failure     Down syndrome     Dysmenorrhea     Irregular heart beat     Knee pain     PAULY    Pain in both feet     Sleep apnea     WEARS CPAP    Thyroid disease     Urinary incontinence     Uses hearing aid      Past Surgical History:   Procedure Laterality Date    CARDIAC VALVE SURGERY      EAR GRAFT BONE Left     HYSTERECTOMY SUPRACERVICAL N/A 2016    Procedure: LAPAROSCOPIC ASSISTED SUPRACERVICAL HYSTERECTOMY WITH BILATERAL SALPINGECTOMY;  Surgeon: Howie Faria MD;  [place] [facility] [place] [facility] [place]:     TONSILLECTOMY AND ADENOIDECTOMY      TYMPANOPLASTY W/ MASTOIDECTOMY      X3       SOCIAL Hx:      The following portions of the patient's history were reviewed and updated as appropriate: allergies, current medications, past family history, past medical history, past social history, past surgical history and problem  list.    Review of Systems        Except as outlined in history of physical illness, patient denies any changes in her GYN, , GI systems.  All other systems reviewed were negative.         Current Outpatient Medications:     albuterol (ACCUNEB) 1.25 MG/3ML nebulizer solution, Take 3 mL by nebulization Every 6 (Six) Hours As Needed for Wheezing., Disp: 1 each, Rfl: 12    albuterol (PROVENTIL) (2.5 MG/3ML) 0.083% nebulizer solution, Take 2.5 mg by nebulization Every 4 (Four) Hours As Needed for Wheezing or Shortness of Air., Disp: 30 mL, Rfl: 3    ALLERGY SERUM INJECTION, Inject  under the skin 1 (One) Time., Disp: , Rfl:     Azelastine HCl 137 MCG/SPRAY solution, INSTILL 1-2 SPRAYS IN EACH NOSTRIL TWICE DAILY AS NEEDED, Disp: , Rfl:     B Complex Vitamins (VITAMIN B COMPLEX PO), Take  by mouth., Disp: , Rfl:     Boric Acid powder, , Disp: , Rfl:     cetirizine (zyrTEC) 10 MG tablet, TAKE 1 TABLET BY MOUTH EVERY DAY, Disp: 30 tablet, Rfl: 5    Cholecalciferol (VITAMIN D) 2000 UNITS tablet, Take 1 tablet by mouth Daily., Disp: , Rfl:     CINNAMON PO, Take  by mouth., Disp: , Rfl:     CRANBERRY EXTRACT PO, Take  by mouth., Disp: , Rfl:     Cyanocobalamin (Rapid B-12 Energy) 200 MCG/SPRAY liquid, Take  by mouth., Disp: , Rfl:     Diclofenac Sodium (VOLTAREN) 1 % gel gel, Apply 4 g topically to the appropriate area as directed 4 (Four) Times a Day As Needed (As needed for pain symptoms)., Disp: 100 g, Rfl: 5    docusate sodium (COLACE) 100 MG capsule, TAKE 1 CAPSULE BY MOUTH EVERY NIGHT AT BEDTIME., Disp: 90 capsule, Rfl: 3    Emollient (AQUAPHOR ADVANCED THERAPY EX), Apply  topically., Disp: , Rfl:     Flaxseed, Linseed, (FLAX SEED OIL PO), Take  by mouth., Disp: , Rfl:     ketoconazole (NIZORAL) 2 % shampoo, , Disp: , Rfl:     levothyroxine (SYNTHROID, LEVOTHROID) 88 MCG tablet, Take 1 tablet by mouth Daily., Disp: , Rfl:     lidocaine (LIDODERM) 5 %, Place 1 patch on the skin as directed by provider Daily. Remove &  "Discard patch within 12 hours or as directed by MD, Disp: 30 patch, Rfl: 5    melatonin 5 MG tablet tablet, Take  by mouth., Disp: , Rfl:     montelukast (SINGULAIR) 10 MG tablet, Take 1 tablet by mouth Daily., Disp: , Rfl: 11    mupirocin (BACTROBAN) 2 % ointment, APPLY TWICE DAILY TO BOILS AND CYSTS IN GROIN REGION, Disp: , Rfl:     olopatadine (PATADAY) 0.2 % solution ophthalmic solution, , Disp: , Rfl:     polyethylene glycol (MIRALAX) 17 GM/SCOOP powder, DISSOLVE 17 GRAMS INTO WATER AND DRINK BY MOUTH EVERY DAY, Disp: 238 g, Rfl: 1    Probiotic Product (PROBIOTIC-10 PO), Take  by mouth., Disp: , Rfl:     Siladryl Allergy 12.5 MG/5ML liquid, TAKE 10 MLS BY MOUTH EVERY 4 HOURS AS NEEDED, Disp: , Rfl:     tobramycin-dexamethasone (TOBRADEX) 0.3-0.1 % ophthalmic suspension, L ear, Disp: 3 mL, Rfl: 0    vitamin C (ASCORBIC ACID) 500 MG tablet, Take 2 tablets by mouth Daily., Disp: , Rfl:     Zinc 22.5 MG tablet, Take 22.5 mg by mouth., Disp: , Rfl:     Ascorbic Acid 500 MG chewable tablet, Chew 1,000 mg., Disp: , Rfl:     NON FORMULARY, 500 mcg. Garden of Life b12 spray, Disp: , Rfl:     tobramycin 0.3 % solution ophthalmic solution, , Disp: , Rfl:    Objective   Physical Exam    /70   Ht 138.4 cm (54.5\")   Wt 54.9 kg (121 lb)   LMP 05/19/2016   BMI 28.64 kg/m²     General: Patient is alert and oriented and appears overall healthy  Neck: Is supple without thyromegaly, no carotid bruits and no lymphadenopathy  Lungs: Clear bilaterally, no wheezing, rhonchi, or rales.  Respiratory rate is normal  Breast: Even, symmetrical, no lymphadenopathy, no retraction, no discharge, no masses or cysts  Heart: Regular rate and rhythm are appreciated, no murmurs or rubs are heard  Abdomen: Is soft, without organomegaly, bowel sounds are positive, there is no rebound or guarding and palpation does not produce any discomfort  Back: Nontender without CVA tenderness  Pelvic: External genitalia appear normal and consistent " with mature female.  BUS normal                            Vagina is clean dry without discharge and appears adequately estrogenized, no lesions or masses are present                         Cervix is noninflamed without discharge or lesions.  There is no cervical motion tenderness.                Uterus is absent             Adnexa are non-enlarged, non tender               Rectal exam reveals adequate sphincter tone and no masses or lesions are appreciated on digital rectal examination.      Annual Well Woman Exam  Patient Active Problem List   Diagnosis    Down syndrome    Injury of index finger    Knee pain    ENRIQUE (obstructive sleep apnea)    Pain in joint involving pelvic region and thigh    Patellar subluxation    Patellofemoral pain syndrome of right knee    Pes planovalgus    Intact hymenal ring    History of hysterectomy, supracervical    Astigmatism    Constipation    Hashimoto's thyroiditis    Hearing deficit, unspecified laterality    Medial tibial stress syndrome, right, initial encounter    Primary hypothyroidism    S/P complete atrioventricular canal repair    Seasonal allergies    Snoring    Vitamin D deficiency    Complete trisomy 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 Down syndrome (Primary)    2. History of hysterectomy, supracervical    3. Encounter for annual physical exam    Reassuring GYN exam, pamela seems to have mostly resolved  History as outlined above  Will continue to follow-up with her other healthcare providers.  Discussed today's findings and concerns with patient.  Continue to recommend regular exercise including cardiovascular and resistance training as well as  breast self-exam. Wellness lab, mammography, & pap smear, in accordance with age guidelines.    I have encouraged her to call for today's test results if she has not received them within 10 days.  Patient is advised to call with any change in her condition  or with any other questions, otherwise return  for annual examination.

## 2025-06-18 ENCOUNTER — OFFICE VISIT (OUTPATIENT)
Dept: FAMILY MEDICINE CLINIC | Facility: CLINIC | Age: 25
End: 2025-06-18
Payer: MEDICARE

## 2025-06-18 VITALS
SYSTOLIC BLOOD PRESSURE: 122 MMHG | HEIGHT: 55 IN | HEART RATE: 60 BPM | WEIGHT: 120 LBS | RESPIRATION RATE: 18 BRPM | DIASTOLIC BLOOD PRESSURE: 84 MMHG | OXYGEN SATURATION: 100 % | BODY MASS INDEX: 27.77 KG/M2

## 2025-06-18 DIAGNOSIS — K59.09 CHRONIC CONSTIPATION: ICD-10-CM

## 2025-06-18 DIAGNOSIS — M25.512 CHRONIC PAIN IN LEFT SHOULDER: ICD-10-CM

## 2025-06-18 DIAGNOSIS — R53.82 CHRONIC FATIGUE: ICD-10-CM

## 2025-06-18 DIAGNOSIS — R79.9 ABNORMAL FINDING OF BLOOD CHEMISTRY, UNSPECIFIED: ICD-10-CM

## 2025-06-18 DIAGNOSIS — E06.3 HASHIMOTO'S THYROIDITIS: ICD-10-CM

## 2025-06-18 DIAGNOSIS — M25.50 ARTHRALGIA, UNSPECIFIED JOINT: ICD-10-CM

## 2025-06-18 DIAGNOSIS — J30.2 SEASONAL ALLERGIES: ICD-10-CM

## 2025-06-18 DIAGNOSIS — G89.29 CHRONIC PAIN IN LEFT SHOULDER: ICD-10-CM

## 2025-06-18 DIAGNOSIS — Q90.9 DOWN SYNDROME: ICD-10-CM

## 2025-06-18 DIAGNOSIS — E55.9 VITAMIN D DEFICIENCY, UNSPECIFIED: ICD-10-CM

## 2025-06-18 DIAGNOSIS — Z00.00 ENCOUNTER FOR SUBSEQUENT ANNUAL WELLNESS VISIT (AWV) IN MEDICARE PATIENT: Primary | ICD-10-CM

## 2025-06-18 DIAGNOSIS — G47.33 OSA (OBSTRUCTIVE SLEEP APNEA): ICD-10-CM

## 2025-06-18 DIAGNOSIS — Z98.890 S/P COMPLETE ATRIOVENTRICULAR CANAL REPAIR: ICD-10-CM

## 2025-06-18 LAB
25(OH)D3+25(OH)D2 SERPL-MCNC: 72.8 NG/ML (ref 30–100)
ALBUMIN SERPL-MCNC: 4.3 G/DL (ref 3.5–5.2)
ALBUMIN/GLOB SERPL: 1.7 G/DL
ALP SERPL-CCNC: 93 U/L (ref 39–117)
ALT SERPL-CCNC: 14 U/L (ref 1–33)
AST SERPL-CCNC: 22 U/L (ref 1–32)
BILIRUB SERPL-MCNC: 0.5 MG/DL (ref 0–1.2)
BUN SERPL-MCNC: 11 MG/DL (ref 6–20)
BUN/CREAT SERPL: 14.3 (ref 7–25)
CALCIUM SERPL-MCNC: 9.1 MG/DL (ref 8.6–10.5)
CHLORIDE SERPL-SCNC: 105 MMOL/L (ref 98–107)
CHOLEST SERPL-MCNC: 128 MG/DL (ref 0–200)
CHOLEST/HDLC SERPL: 2.37 {RATIO}
CO2 SERPL-SCNC: 25.8 MMOL/L (ref 22–29)
CREAT SERPL-MCNC: 0.77 MG/DL (ref 0.57–1)
EGFRCR SERPLBLD CKD-EPI 2021: 109.3 ML/MIN/1.73
FOLATE SERPL-MCNC: >20 NG/ML (ref 4.78–24.2)
GLOBULIN SER CALC-MCNC: 2.5 GM/DL
GLUCOSE SERPL-MCNC: 79 MG/DL (ref 65–99)
HBA1C MFR BLD: 4.6 % (ref 4.8–5.6)
HDLC SERPL-MCNC: 54 MG/DL (ref 40–60)
LDLC SERPL CALC-MCNC: 63 MG/DL (ref 0–100)
POTASSIUM SERPL-SCNC: 4.4 MMOL/L (ref 3.5–5.2)
PROT SERPL-MCNC: 6.8 G/DL (ref 6–8.5)
SODIUM SERPL-SCNC: 141 MMOL/L (ref 136–145)
TRIGL SERPL-MCNC: 47 MG/DL (ref 0–150)
VIT B12 SERPL-MCNC: 818 PG/ML (ref 211–946)
VLDLC SERPL CALC-MCNC: 11 MG/DL (ref 5–40)

## 2025-06-18 RX ORDER — ELDERBERRY FRUIT 50 MG/5 ML
SYRUP ORAL
COMMUNITY

## 2025-06-18 NOTE — ASSESSMENT & PLAN NOTE
cardio follow up and echo yearly , limit caffeine  ongoing adult congenital heart disease (ACHD) evaluation of a complete AV septal defect, s/p single patch repair and Down Syndrome.

## 2025-06-18 NOTE — PROGRESS NOTES
Subjective   The ABCs of the Annual Wellness Visit  Medicare Wellness Visit      Chantale Reynolds is a 25 y.o. patient who presents for a Medicare Wellness Visit.    The following portions of the patient's history were reviewed and   updated as appropriate: allergies, current medications, past family history, past medical history, past social history, past surgical history, and problem list.    Compared to one year ago, the patient's physical   health is the same.  Compared to one year ago, the patient's mental   health is the same.    Recent Hospitalizations:  She was not admitted to the hospital during the last year.     Current Medical Providers:  Patient Care Team:  Kalpana Patrick APRN as PCP - General (Family Medicine)  Angel Payan MD as Consulting Physician (Pediatric Cardiology)  Alice Sandy MD as Consulting Physician (Pediatric Endocrinology)  Augustin Hoang MD as Surgeon (Pediatric Orthopedic Surgery)  Maddi Granados MD (Sleep Medicine)  Rodney Anderson MD as Consulting Physician (Allergy)  Howie Faria MD as Gynecologist (Obstetrics and Gynecology)    Outpatient Medications Prior to Visit   Medication Sig Dispense Refill    albuterol (ACCUNEB) 1.25 MG/3ML nebulizer solution Take 3 mL by nebulization Every 6 (Six) Hours As Needed for Wheezing. 1 each 12    ALLERGY SERUM INJECTION Inject  under the skin 1 (One) Time.      Ascorbic Acid 500 MG chewable tablet Chew 1,000 mg.      Azelastine HCl 137 MCG/SPRAY solution INSTILL 1-2 SPRAYS IN EACH NOSTRIL TWICE DAILY AS NEEDED      B Complex Vitamins (VITAMIN B COMPLEX PO) Take  by mouth.      Black Elderberry (Sambucus Elderberry) 50 MG/5ML syrup Take  by mouth.      Boric Acid powder       cetirizine (zyrTEC) 10 MG tablet TAKE 1 TABLET BY MOUTH EVERY DAY 30 tablet 5    Cholecalciferol (VITAMIN D) 2000 UNITS tablet Take 1 tablet by mouth Daily.      CINNAMON PO Take  by mouth.      CRANBERRY EXTRACT PO Take  by mouth.       Cyanocobalamin (Rapid B-12 Energy) 200 MCG/SPRAY liquid Take  by mouth.      Diclofenac Sodium (VOLTAREN) 1 % gel gel Apply 4 g topically to the appropriate area as directed 4 (Four) Times a Day As Needed (As needed for pain symptoms). 100 g 5    docusate sodium (COLACE) 100 MG capsule TAKE 1 CAPSULE BY MOUTH EVERY NIGHT AT BEDTIME. 90 capsule 3    Emollient (AQUAPHOR ADVANCED THERAPY EX) Apply  topically.      Flaxseed, Linseed, (FLAX SEED OIL PO) Take  by mouth.      ketoconazole (NIZORAL) 2 % shampoo       levothyroxine (SYNTHROID, LEVOTHROID) 88 MCG tablet Take 1 tablet by mouth Daily.      lidocaine (LIDODERM) 5 % Place 1 patch on the skin as directed by provider Daily. Remove & Discard patch within 12 hours or as directed by MD 30 patch 5    melatonin 5 MG tablet tablet Take 0.5 tablets by mouth.      montelukast (SINGULAIR) 10 MG tablet Take 1 tablet by mouth Daily.  11    olopatadine (PATADAY) 0.2 % solution ophthalmic solution       polyethylene glycol (MIRALAX) 17 GM/SCOOP powder DISSOLVE 17 GRAMS INTO WATER AND DRINK BY MOUTH EVERY DAY (Patient taking differently: Take 17 g by mouth Daily. half) 238 g 1    Probiotic Product (PROBIOTIC-10 PO) Take  by mouth.      Siladryl Allergy 12.5 MG/5ML liquid TAKE 10 MLS BY MOUTH EVERY 4 HOURS AS NEEDED      tobramycin-dexamethasone (TOBRADEX) 0.3-0.1 % ophthalmic suspension L ear 3 mL 0    Zinc 22.5 MG tablet Take 30 mg by mouth. raw      NON FORMULARY 500 mcg. Garden of Life b12 spray      albuterol (PROVENTIL) (2.5 MG/3ML) 0.083% nebulizer solution Take 2.5 mg by nebulization Every 4 (Four) Hours As Needed for Wheezing or Shortness of Air. (Patient not taking: Reported on 6/18/2025) 30 mL 3    mupirocin (BACTROBAN) 2 % ointment APPLY TWICE DAILY TO BOILS AND CYSTS IN GROIN REGION      tobramycin 0.3 % solution ophthalmic solution  (Patient not taking: Reported on 1/13/2025)      vitamin C (ASCORBIC ACID) 500 MG tablet Take 2 tablets by mouth Daily.       No  "facility-administered medications prior to visit.     No opioid medication identified on active medication list. I have reviewed chart for other potential  high risk medication/s and harmful drug interactions in the elderly.      Aspirin is not on active medication list.  Aspirin use is not indicated based on review of current medical condition/s. Risk of harm outweighs potential benefits.  .    Patient Active Problem List   Diagnosis    Down syndrome    Injury of index finger    Knee pain    ENRIQUE (obstructive sleep apnea)    Pain in joint involving pelvic region and thigh    Patellar subluxation    Patellofemoral pain syndrome of right knee    Pes planovalgus    Intact hymenal ring    History of hysterectomy, supracervical    Astigmatism    Constipation    Hashimoto's thyroiditis    Hearing deficit, unspecified laterality    Medial tibial stress syndrome, right, initial encounter    Primary hypothyroidism    S/P complete atrioventricular canal repair    Seasonal allergies    Snoring    Vitamin D deficiency    Complete trisomy 21 syndrome    COVID-19     Advance Care Planning Advance Directive is not on file.  ACP discussion was declined by the patient. Patient does not have an advance directive, declines further assistance.            Objective   Vitals:    06/18/25 0955   BP: 122/84   Pulse: 60   Resp: 18   SpO2: 100%   Weight: 54.4 kg (120 lb)   Height: 138.4 cm (54.5\")   PainSc: 3        Estimated body mass index is 28.4 kg/m² as calculated from the following:    Height as of this encounter: 138.4 cm (54.5\").    Weight as of this encounter: 54.4 kg (120 lb).                Does the patient have evidence of cognitive impairment? Yes                                                                                                Health  Risk Assessment    Smoking Status:  Social History     Tobacco Use   Smoking Status Never   Smokeless Tobacco Never     Alcohol Consumption:  Social History     Substance and Sexual " Activity   Alcohol Use No       Fall Risk Screen  TRE Fall Risk Assessment was completed, and patient is at LOW risk for falls.Assessment completed on:2025    Depression Screening   Little interest or pleasure in doing things? Not at all   Feeling down, depressed, or hopeless? Not at all   PHQ-2 Total Score 0      Health Habits and Functional and Cognitive Screenin/18/2025     9:03 AM   Functional & Cognitive Status   Do you have difficulty preparing food and eating? Yes   Do you have difficulty bathing yourself, getting dressed or grooming yourself? Yes   Do you have difficulty using the toilet? Yes   Do you have difficulty moving around from place to place? No   Do you have trouble with steps or getting out of a bed or a chair? No   Current Diet Well Balanced Diet   Dental Exam Up to date   Eye Exam Up to date   Exercise (times per week) 4 times per week   Current Exercises Include Dancing;Hiking   Do you need help using the phone?  Yes   Are you deaf or do you have serious difficulty hearing?  Yes   Do you need help to go to places out of walking distance? Yes   Do you need help shopping? Yes   Do you need help preparing meals?  Yes   Do you need help with housework?  Yes   Do you need help with laundry? Yes   Do you need help taking your medications? Yes   Do you need help managing money? Yes   Do you ever drive or ride in a car without wearing a seat belt? No   Have you felt unusual stress, anger or loneliness in the last month? No   Who do you live with? Other   If you need help, do you have trouble finding someone available to you? No   Have you been bothered in the last four weeks by sexual problems? No   Do you have difficulty concentrating, remembering or making decisions? Yes           Age-appropriate Screening Schedule:  Refer to the list below for future screening recommendations based on patient's age, sex and/or medical conditions. Orders for these recommended tests are listed in the  plan section. The patient has been provided with a written plan.    Health Maintenance List  Health Maintenance   Topic Date Due    HPV VACCINES (1 - 3-dose series) Never done    COVID-19 Vaccine (1 - 2024-25 season) Never done    INFLUENZA VACCINE  07/01/2025    ANNUAL WELLNESS VISIT  06/18/2026    TDAP/TD VACCINES (3 - Td or Tdap) 06/12/2033    HEPATITIS C SCREENING  Completed    Pneumococcal Vaccine 0-49  Completed                                                                                                                                                CMS Preventative Services Quick Reference  Risk Factors Identified During Encounter  Fall Risk-High or Moderate: Discussed Fall Prevention in the home  Dental Screening Recommended  Vision Screening Recommended    The above risks/problems have been discussed with the patient.  Pertinent information has been shared with the patient in the After Visit Summary.  An After Visit Summary and PPPS were made available to the patient.    Follow Up:   Next Medicare Wellness visit to be scheduled in 1 year.         Additional E&M Note during same encounter follows:  Patient has additional, significant, and separately identifiable condition(s)/problem(s) that require work above and beyond the Medicare Wellness Visit     Chief Complaint  Annual Exam      Subjective   HPI      Review of Systems   Constitutional: Negative.  Negative for activity change, fatigue and fever.   HENT: Negative.  Negative for congestion.    Respiratory:  Negative for cough, shortness of breath and wheezing.    Cardiovascular:  Negative for chest pain, palpitations and leg swelling.   Gastrointestinal:  Positive for constipation. Negative for diarrhea, nausea and vomiting.   Endocrine: Negative for polydipsia, polyphagia and polyuria.   Genitourinary:  Negative for dysuria and hematuria.   Musculoskeletal:  Positive for arthralgias and back pain.   Skin:  Negative for wound.   Allergic/Immunologic:  "Negative for environmental allergies.   Neurological:  Negative for seizures and syncope.   Hematological:  Does not bruise/bleed easily.   Psychiatric/Behavioral:  Negative for dysphoric mood and sleep disturbance.        Multiple concerns discussed with patient and mom.    Chronic joint pain, back pain > 1 year. Worse at night. Wants to see podiatrist. Is supposed to wear ortho inserts. These are old. She dances for exercise. She denies foot pain but has low back pain, left hip pain, R shin pain.  Painful joints. This is affecting her sleep. She does not take over the counter meds, she uses pillows for support. She wears tennis shoes mostly. Uses voltaren and lidocaine patches at time.     Chronic Left shoulder pain and joint pain, she does not use over the counter meds, but uses pillows for comfort. Had prev seen Dr Lee, done PT and had shoulder injection.    Chronic constipation, belly is churning. Denies GERD. No nausea or vomiting.  She eats a lot of fiber, uses docusate and miralax and fiber gummies. She has BM nightly. She uses stool to elevate feet. She takes probiotics.    Mom reports she was once told she has scoliosis. No obvious curve, has not had follow up imaging      Objective   Vital Signs:  /84   Pulse 60   Resp 18   Ht 138.4 cm (54.5\")   Wt 54.4 kg (120 lb)   SpO2 100%   BMI 28.40 kg/m²   Physical Exam  Vitals reviewed.   Constitutional:       Appearance: Normal appearance.   HENT:      Head: Normocephalic.      Right Ear: Tympanic membrane normal.      Left Ear: Tympanic membrane normal.      Nose: Nose normal.      Mouth/Throat:      Mouth: Mucous membranes are moist.   Eyes:      Pupils: Pupils are equal, round, and reactive to light.   Cardiovascular:      Rate and Rhythm: Normal rate and regular rhythm.      Pulses: Normal pulses.      Heart sounds: Normal heart sounds.   Pulmonary:      Effort: Pulmonary effort is normal.      Breath sounds: Normal breath sounds.   Abdominal: "      General: Bowel sounds are normal.      Palpations: Abdomen is soft.   Musculoskeletal:         General: Tenderness present. Normal range of motion.      Cervical back: Normal range of motion.      Comments: No scoliosis or joint abnormalities on exam    Skin:     General: Skin is warm.   Neurological:      Mental Status: She is alert. Mental status is at baseline.      Comments: Down syndrome    Psychiatric:         Mood and Affect: Mood normal.                    Assessment and Plan      Encounter for subsequent annual wellness visit (AWV) in Medicare patient         Seasonal allergies       cw meds, avoid triggers , allergy shots , sees ENT  Hashimoto's thyroiditis    Orders:    Comprehensive Metabolic Panel    Lipid Panel With / Chol / HDL Ratio  Sees endo for hypothyroidism, last TSH was normal.   ENRIQUE (obstructive sleep apnea)         Down syndrome    Orders:    Ambulatory Referral to Podiatry    Arthralgia, unspecified joint    Orders:    Ambulatory Referral to Podiatry    Ambulatory Referral to Orthopedic Surgery    Ambulatory Referral to Physical Therapy for Evaluation & Treatment  has prev done shoulder injection in left shoulder and completed physical therapy  -is willing to try PT again  Chronic constipation       cw fiber, water, exercise, fiber gummies, add magnesium   Chronic pain in left shoulder    Orders:    Ambulatory Referral to Orthopedic Surgery    Ambulatory Referral to Physical Therapy for Evaluation & Treatment  refer ortho  Chronic fatigue    Orders:    Hemoglobin A1c    Vitamin D 25 hydroxy    Vitamin B12    Folate    Abnormal finding of blood chemistry, unspecified    Orders:    Hemoglobin A1c    Vitamin D deficiency, unspecified    Orders:    Vitamin D 25 hydroxy    S/P complete atrioventricular canal repair       cardio follow up and echo yearly , limit caffeine  ongoing adult congenital heart disease (ACHD) evaluation of a complete AV septal defect, s/p single patch repair and Down  Syndrome.     Refer PT  Advised heat, ice, topical rubs., lidocaine patches as needed for pain, cw exercise  Refer back to ortho for possible shoulder injection       Follow Up   Return in about 1 year (around 6/18/2026) for Medicare Wellness.  Patient was given instructions and counseling regarding her condition or for health maintenance advice. Please see specific information pulled into the AVS if appropriate.

## 2025-06-18 NOTE — ASSESSMENT & PLAN NOTE
Orders:    Comprehensive Metabolic Panel    Lipid Panel With / Chol / HDL Ratio  Sees endo for hypothyroidism, last TSH was normal.

## 2025-06-25 ENCOUNTER — HOSPITAL ENCOUNTER (EMERGENCY)
Facility: HOSPITAL | Age: 25
Discharge: HOME | End: 2025-06-25
Attending: EMERGENCY MEDICINE | Admitting: EMERGENCY MEDICINE
Payer: MEDICARE

## 2025-06-25 ENCOUNTER — APPOINTMENT (OUTPATIENT)
Dept: CT IMAGING | Facility: HOSPITAL | Age: 25
End: 2025-06-25
Payer: MEDICARE

## 2025-06-25 VITALS
BODY MASS INDEX: 27.77 KG/M2 | HEART RATE: 68 BPM | DIASTOLIC BLOOD PRESSURE: 78 MMHG | RESPIRATION RATE: 14 BRPM | OXYGEN SATURATION: 100 % | WEIGHT: 120 LBS | TEMPERATURE: 98 F | HEIGHT: 55 IN | SYSTOLIC BLOOD PRESSURE: 120 MMHG

## 2025-06-25 DIAGNOSIS — K59.00 CONSTIPATION, UNSPECIFIED CONSTIPATION TYPE: ICD-10-CM

## 2025-06-25 DIAGNOSIS — K52.9 ENTERITIS: Primary | ICD-10-CM

## 2025-06-25 LAB
ALBUMIN SERPL-MCNC: 4.1 G/DL (ref 3.5–5.2)
ALBUMIN/GLOB SERPL: 1.5 G/DL
ALP SERPL-CCNC: 90 U/L (ref 39–117)
ALT SERPL W P-5'-P-CCNC: 17 U/L (ref 1–33)
ANION GAP SERPL CALCULATED.3IONS-SCNC: 10.4 MMOL/L (ref 5–15)
AST SERPL-CCNC: 21 U/L (ref 1–32)
BASOPHILS # BLD AUTO: 0.02 10*3/MM3 (ref 0–0.2)
BASOPHILS NFR BLD AUTO: 0.3 % (ref 0–1.5)
BILIRUB SERPL-MCNC: 0.2 MG/DL (ref 0–1.2)
BILIRUB UR QL STRIP: NEGATIVE
BUN SERPL-MCNC: 15.3 MG/DL (ref 6–20)
BUN/CREAT SERPL: 19.4 (ref 7–25)
CALCIUM SPEC-SCNC: 9.6 MG/DL (ref 8.6–10.5)
CHLORIDE SERPL-SCNC: 103 MMOL/L (ref 98–107)
CLARITY UR: CLEAR
CO2 SERPL-SCNC: 23.6 MMOL/L (ref 22–29)
COLOR UR: YELLOW
CREAT SERPL-MCNC: 0.79 MG/DL (ref 0.57–1)
DEPRECATED RDW RBC AUTO: 49.8 FL (ref 37–54)
EGFRCR SERPLBLD CKD-EPI 2021: 106 ML/MIN/1.73
EOSINOPHIL # BLD AUTO: 0.02 10*3/MM3 (ref 0–0.4)
EOSINOPHIL NFR BLD AUTO: 0.3 % (ref 0.3–6.2)
ERYTHROCYTE [DISTWIDTH] IN BLOOD BY AUTOMATED COUNT: 13.3 % (ref 12.3–15.4)
GLOBULIN UR ELPH-MCNC: 2.7 GM/DL
GLUCOSE SERPL-MCNC: 98 MG/DL (ref 65–99)
GLUCOSE UR STRIP-MCNC: NEGATIVE MG/DL
HCT VFR BLD AUTO: 45.6 % (ref 34–46.6)
HGB BLD-MCNC: 14.8 G/DL (ref 12–15.9)
HGB UR QL STRIP.AUTO: NEGATIVE
IMM GRANULOCYTES # BLD AUTO: 0.01 10*3/MM3 (ref 0–0.05)
IMM GRANULOCYTES NFR BLD AUTO: 0.1 % (ref 0–0.5)
KETONES UR QL STRIP: NEGATIVE
LEUKOCYTE ESTERASE UR QL STRIP.AUTO: NEGATIVE
LIPASE SERPL-CCNC: 25 U/L (ref 13–60)
LYMPHOCYTES # BLD AUTO: 1.23 10*3/MM3 (ref 0.7–3.1)
LYMPHOCYTES NFR BLD AUTO: 17.2 % (ref 19.6–45.3)
MCH RBC QN AUTO: 32.5 PG (ref 26.6–33)
MCHC RBC AUTO-ENTMCNC: 32.5 G/DL (ref 31.5–35.7)
MCV RBC AUTO: 100.2 FL (ref 79–97)
MONOCYTES # BLD AUTO: 0.43 10*3/MM3 (ref 0.1–0.9)
MONOCYTES NFR BLD AUTO: 6 % (ref 5–12)
NEUTROPHILS NFR BLD AUTO: 5.44 10*3/MM3 (ref 1.7–7)
NEUTROPHILS NFR BLD AUTO: 76.1 % (ref 42.7–76)
NITRITE UR QL STRIP: NEGATIVE
PH UR STRIP.AUTO: 5.5 [PH] (ref 5–8)
PLATELET # BLD AUTO: 198 10*3/MM3 (ref 140–450)
PMV BLD AUTO: 9.7 FL (ref 6–12)
POTASSIUM SERPL-SCNC: 4.3 MMOL/L (ref 3.5–5.2)
PROT SERPL-MCNC: 6.8 G/DL (ref 6–8.5)
PROT UR QL STRIP: NEGATIVE
RBC # BLD AUTO: 4.55 10*6/MM3 (ref 3.77–5.28)
SODIUM SERPL-SCNC: 137 MMOL/L (ref 136–145)
SP GR UR STRIP: 1.01 (ref 1–1.03)
UROBILINOGEN UR QL STRIP: NORMAL
WBC NRBC COR # BLD AUTO: 7.15 10*3/MM3 (ref 3.4–10.8)

## 2025-06-25 PROCEDURE — 74177 CT ABD & PELVIS W/CONTRAST: CPT

## 2025-06-25 PROCEDURE — 85025 COMPLETE CBC W/AUTO DIFF WBC: CPT | Performed by: PHYSICIAN ASSISTANT

## 2025-06-25 PROCEDURE — 81003 URINALYSIS AUTO W/O SCOPE: CPT | Performed by: PHYSICIAN ASSISTANT

## 2025-06-25 PROCEDURE — 25510000001 IOPAMIDOL PER 1 ML: Performed by: EMERGENCY MEDICINE

## 2025-06-25 PROCEDURE — 80053 COMPREHEN METABOLIC PANEL: CPT | Performed by: PHYSICIAN ASSISTANT

## 2025-06-25 PROCEDURE — 99284 EMERGENCY DEPT VISIT MOD MDM: CPT | Performed by: PHYSICIAN ASSISTANT

## 2025-06-25 PROCEDURE — 99285 EMERGENCY DEPT VISIT HI MDM: CPT

## 2025-06-25 PROCEDURE — 83690 ASSAY OF LIPASE: CPT | Performed by: PHYSICIAN ASSISTANT

## 2025-06-25 RX ORDER — IOPAMIDOL 755 MG/ML
100 INJECTION, SOLUTION INTRAVASCULAR
Status: COMPLETED | OUTPATIENT
Start: 2025-06-25 | End: 2025-06-25

## 2025-06-25 RX ADMIN — IOPAMIDOL 85 ML: 755 INJECTION, SOLUTION INTRAVENOUS at 11:47

## 2025-06-25 NOTE — FSED PROVIDER NOTE
Subjective   History of Present Illness  Patient is a 25-year-old female with a history of chronic constipation.  She had an episode of severe abdominal pain today at home that she says had her on the floor.  She is not having any abdominal pain now.  Denies any  complaints.  Not having fevers.  No other complaints.      Review of Systems   Constitutional:  Negative for fever.   Gastrointestinal:  Positive for abdominal pain and constipation. Negative for blood in stool, diarrhea and vomiting.   Genitourinary: Negative.    All other systems reviewed and are negative.      Past Medical History:   Diagnosis Date    Acid reflux     Anal fissure     ASD (atrial septal defect)     Asthma     MILD    AVD (aortic valve disease)     Congestive heart failure     Down syndrome     Dysmenorrhea     Irregular heart beat     Knee pain     PAULY    Pain in both feet     Sleep apnea     WEARS CPAP    Thyroid disease     Urinary incontinence     Uses hearing aid        Allergies   Allergen Reactions    Latex Other (See Comments)     Other reaction(s): Other (See Comments)   Precautions      Precautions      Other reaction(s): Other (See Comments)   Precautions   Other reaction(s): Unknown cause   Other reaction(s)   Other reaction(s)    Corn Other (See Comments)    Corn Oil Other (See Comments)     Other reaction(s): Other (See Comments)   Other reaction(s): Other (See Comments)    Egg-Derived Products Other (See Comments)     Other reaction(s): Other (See Comments)   Other reaction(s): Other (See Comments)    Octacosanol Other (See Comments)     Other reaction(s): Other (See Comments)   Other reaction(s): Other (See Comments)    Seasonal Ic [Cholestatin] Other (See Comments)       Past Surgical History:   Procedure Laterality Date    CARDIAC VALVE SURGERY      EAR GRAFT BONE Left     HYSTERECTOMY SUPRACERVICAL N/A 06/01/2016    Procedure: LAPAROSCOPIC ASSISTED SUPRACERVICAL HYSTERECTOMY WITH BILATERAL SALPINGECTOMY;  Surgeon:  Howie Faria MD;  Location: [facility] [place] [facility] [place];  Service:     TONSILLECTOMY AND ADENOIDECTOMY      TYMPANOPLASTY W/ MASTOIDECTOMY      X3       Family History   Problem Relation Age of Onset    No Known Problems Father     Autoimmune disease Mother     Breast cancer Maternal Grandmother 74    Ovarian cancer Neg Hx     Uterine cancer Neg Hx     Colon cancer Neg Hx        Social History     Socioeconomic History    Marital status: Single   Tobacco Use    Smoking status: Never    Smokeless tobacco: Never   Vaping Use    Vaping status: Never Used   Substance and Sexual Activity    Alcohol use: No    Drug use: No    Sexual activity: Never           Objective   Physical Exam  Vitals and nursing note reviewed.   Constitutional:       Appearance: She is well-developed.   Cardiovascular:      Rate and Rhythm: Normal rate and regular rhythm.   Pulmonary:      Effort: Pulmonary effort is normal.      Breath sounds: Normal breath sounds.   Abdominal:      General: Abdomen is flat. Bowel sounds are normal.      Palpations: Abdomen is soft.      Tenderness: There is no abdominal tenderness. There is no right CVA tenderness or left CVA tenderness.   Skin:     General: Skin is warm and dry.   Neurological:      Mental Status: She is alert.   Psychiatric:         Mood and Affect: Mood normal.         Behavior: Behavior normal.         Procedures           ED Course                                           Medical Decision Making  At presentation patient has benign abdominal exam.  She is not having abdominal pain.  I did order lab work which is reassuring.  CAT scan shows constipation and possibly an enteritis of the small bowel.  She takes MiraLAX, Dulcolax every day.  They will increase the MiraLAX to about 4 doses a day instead of half a dose a day.  They have it at home and do not need prescription.  She has family doctor for follow-up.  I feel that is reasonable.  If she has worsening symptoms she can return to emergency room be  happy to see her.  Very likely the episode of abdominal pain that she had prior to arrival was from bowel gas.  Could also be from the enteritis.  However benign exam, not having pain now.  Low suspicion for surgical condition at this time.  I did tell her to return if anything changes or worsens and she and family voiced understanding and agree with plan.    --------------------            06/25/25               1056     --------------------   BP:       120/78     Pulse:               Resp:                Temp:                SpO2:      100%     --------------------    Labs Reviewed  CBC WITH AUTO DIFFERENTIAL - Abnormal; Notable for the following components:     MCV                           100.2 (*)               Neutrophil %                  76.1 (*)               Lymphocyte %                  17.2 (*)            All other components within normal limits  LIPASE - Normal  URINALYSIS W/ CULTURE IF INDICATED - Normal         Narrative: In absence of clinical symptoms, the presence of pyuria, bacteria, and/or nitrites on the urinalysis result does not correlate with infection.                  Urine microscopic not indicated.  COMPREHENSIVE METABOLIC PANEL         Narrative: GFR Categories in Chronic Kidney Disease (CKD)                                              GFR Category          GFR (mL/min/1.73)    Interpretation                  G1                    90 or greater        Normal or high (1)                  G2                    60-89                Mild decrease (1)                  G3a                   45-59                Mild to moderate decrease                  G3b                   30-44                Moderate to severe decrease                  G4                    15-29                Severe decrease                  G5                    14 or less           Kidney failure                                    (1)In the absence of evidence of kidney disease, neither  GFR category G1 or G2 fulfill the criteria for CKD.                                    eGFR calculation 2021 CKD-EPI creatinine equation, which does not include race as a factor  CBC AND DIFFERENTIAL    CT Abdomen Pelvis With Contrast  Result Date: 6/25/2025   1. Fluid filled mildly distended small bowel loops are nonspecific though may be associated with enteritis. 2. Moderate stool throughout the colon suggests constipation. 3. Mild free fluid in the pelvis. 4. Previous hysterectomy.  This report was finalized on 6/25/2025 12:29 PM by Tevin Coronado M.D on Workstation: [id number]          Problems Addressed:  Constipation, unspecified constipation type: complicated acute illness or injury  Enteritis: complicated acute illness or injury    Amount and/or Complexity of Data Reviewed  Labs: ordered.  Radiology: ordered.    Risk  Prescription drug management.        Final diagnoses:   Enteritis   Constipation, unspecified constipation type       ED Disposition  ED Disposition       ED Disposition   Discharge    Condition   Stable    Comment   --               Kalpana Patrick, APRN  [address] [address]  [phone]    In 1 week           Medication List        Changed      polyethylene glycol 17 GM/SCOOP powder  Commonly known as: MIRALAX  DISSOLVE 17 GRAMS INTO WATER AND DRINK BY MOUTH EVERY DAY  What changed: See the new instructions.

## 2025-07-01 NOTE — PROGRESS NOTES
Patient: Chantale Reynolds  YOB: 2000  Date of Service: 7/1/2025    Chief Complaints: Left shoulder pain    Subjective:    History of Present Illness: Pt is seen in the office today with complaints of left shoulder pain I have not seen her for 8 months at that point we thought she had some impingement we injected her she did physical therapy and did well she has had return of her symptoms this is probably an acute on chronic condition with her Down syndrome she does have some hypermobility and some weakness and I suspect she will have intermittent problems we talked about the importance of getting on a good program and making that a part of her life she remains very active but adding this to her activities        Allergies:   Allergies   Allergen Reactions    Latex Other (See Comments)     Other reaction(s): Other (See Comments)   Precautions      Precautions      Other reaction(s): Other (See Comments)   Precautions   Other reaction(s): Unknown cause   Other reaction(s)   Other reaction(s)    Corn Other (See Comments)    Corn Oil Other (See Comments)     Other reaction(s): Other (See Comments)   Other reaction(s): Other (See Comments)    Egg-Derived Products Other (See Comments)     Other reaction(s): Other (See Comments)   Other reaction(s): Other (See Comments)    Octacosanol Other (See Comments)     Other reaction(s): Other (See Comments)   Other reaction(s): Other (See Comments)    Seasonal Ic [Cholestatin] Other (See Comments)       Medications:   Home Medications:  Current Outpatient Medications on File Prior to Visit   Medication Sig    albuterol (ACCUNEB) 1.25 MG/3ML nebulizer solution Take 3 mL by nebulization Every 6 (Six) Hours As Needed for Wheezing.    albuterol (PROVENTIL) (2.5 MG/3ML) 0.083% nebulizer solution Take 2.5 mg by nebulization Every 4 (Four) Hours As Needed for Wheezing or Shortness of Air. (Patient not taking: Reported on 6/18/2025)    ALLERGY SERUM INJECTION Inject  under  the skin 1 (One) Time.    Ascorbic Acid 500 MG chewable tablet Chew 1,000 mg.    Azelastine HCl 137 MCG/SPRAY solution INSTILL 1-2 SPRAYS IN EACH NOSTRIL TWICE DAILY AS NEEDED    B Complex Vitamins (VITAMIN B COMPLEX PO) Take  by mouth.    Black Elderberry (Sambucus Elderberry) 50 MG/5ML syrup Take  by mouth.    Boric Acid powder     cetirizine (zyrTEC) 10 MG tablet TAKE 1 TABLET BY MOUTH EVERY DAY    Cholecalciferol (VITAMIN D) 2000 UNITS tablet Take 1 tablet by mouth Daily.    CINNAMON PO Take  by mouth.    CRANBERRY EXTRACT PO Take  by mouth.    Cyanocobalamin (Rapid B-12 Energy) 200 MCG/SPRAY liquid Take  by mouth.    Diclofenac Sodium (VOLTAREN) 1 % gel gel Apply 4 g topically to the appropriate area as directed 4 (Four) Times a Day As Needed (As needed for pain symptoms).    docusate sodium (COLACE) 100 MG capsule TAKE 1 CAPSULE BY MOUTH EVERY NIGHT AT BEDTIME.    Emollient (AQUAPHOR ADVANCED THERAPY EX) Apply  topically.    Flaxseed, Linseed, (FLAX SEED OIL PO) Take  by mouth.    ketoconazole (NIZORAL) 2 % shampoo     levothyroxine (SYNTHROID, LEVOTHROID) 88 MCG tablet Take 1 tablet by mouth Daily.    lidocaine (LIDODERM) 5 % Place 1 patch on the skin as directed by provider Daily. Remove & Discard patch within 12 hours or as directed by MD    melatonin 5 MG tablet tablet Take 0.5 tablets by mouth.    montelukast (SINGULAIR) 10 MG tablet Take 1 tablet by mouth Daily.    olopatadine (PATADAY) 0.2 % solution ophthalmic solution     polyethylene glycol (MIRALAX) 17 GM/SCOOP powder DISSOLVE 17 GRAMS INTO WATER AND DRINK BY MOUTH EVERY DAY (Patient taking differently: Take 17 g by mouth Daily. half)    Probiotic Product (PROBIOTIC-10 PO) Take  by mouth.    Siladryl Allergy 12.5 MG/5ML liquid TAKE 10 MLS BY MOUTH EVERY 4 HOURS AS NEEDED    tobramycin-dexamethasone (TOBRADEX) 0.3-0.1 % ophthalmic suspension L ear    Zinc 22.5 MG tablet Take 30 mg by mouth. raw     No current facility-administered medications on file  prior to visit.     Current Medications:  Scheduled Meds:  Continuous Infusions:No current facility-administered medications for this visit.    PRN Meds:.    I have reviewed the patient's medical history in detail and updated the computerized patient record.  Review and summarization of old records include:    Past Medical History:   Diagnosis Date    Acid reflux     Anal fissure     ASD (atrial septal defect)     Asthma     MILD    AVD (aortic valve disease)     Congestive heart failure     Down syndrome     Dysmenorrhea     Irregular heart beat     Knee pain     PAULY    Pain in both feet     Sleep apnea     WEARS CPAP    Thyroid disease     Urinary incontinence     Uses hearing aid         Past Surgical History:   Procedure Laterality Date    CARDIAC VALVE SURGERY      EAR GRAFT BONE Left     HYSTERECTOMY SUPRACERVICAL N/A 06/01/2016    Procedure: LAPAROSCOPIC ASSISTED SUPRACERVICAL HYSTERECTOMY WITH BILATERAL SALPINGECTOMY;  Surgeon: Howie Faria MD;  Location: [facility] OR [facility] [place];  Service:     TONSILLECTOMY AND ADENOIDECTOMY      TYMPANOPLASTY W/ MASTOIDECTOMY      X3        Social History     Occupational History    Not on file   Tobacco Use    Smoking status: Never    Smokeless tobacco: Never   Vaping Use    Vaping status: Never Used   Substance and Sexual Activity    Alcohol use: No    Drug use: No    Sexual activity: Never      Social History     Social History Narrative    Not on file        Family History   Problem Relation Age of Onset    No Known Problems Father     Autoimmune disease Mother     Breast cancer Maternal Grandmother 74    Ovarian cancer Neg Hx     Uterine cancer Neg Hx     Colon cancer Neg Hx        ROS: 14 point review of systems was performed and was negative except for documented findings in HPI and today's encounter.     Allergies:   Allergies   Allergen Reactions    Latex Other (See Comments)     Other reaction(s): Other (See Comments)   Precautions      Precautions      Other reaction(s):  Other (See Comments)   Precautions   Other reaction(s): Unknown cause   Other reaction(s)   Other reaction(s)    Corn Other (See Comments)    Corn Oil Other (See Comments)     Other reaction(s): Other (See Comments)   Other reaction(s): Other (See Comments)    Egg-Derived Products Other (See Comments)     Other reaction(s): Other (See Comments)   Other reaction(s): Other (See Comments)    Octacosanol Other (See Comments)     Other reaction(s): Other (See Comments)   Other reaction(s): Other (See Comments)    Seasonal Ic [Cholestatin] Other (See Comments)     Constitutional:  Denies fever, shaking or chills   Eyes:  Denies change in visual acuity   HENT:  Denies nasal congestion or sore throat   Respiratory:  Denies cough or shortness of breath   Cardiovascular:  Denies chest pain or severe LE edema   GI:  Denies abdominal pain, nausea, vomiting, bloody stools or diarrhea   Musculoskeletal:  Numbness, tingling, or loss of motor function only as noted above in history of present illness.  : Denies painful urination or hematuria  Integument:  Denies rash, lesion or ulceration   Neurologic:  Denies headache or focal weakness  Endocrine:  Denies lymphadenopathy  Psych:  Denies confusion or change in mental status   Hem:  Denies active bleeding      Physical Exam: 25 y.o. female  Wt Readings from Last 3 Encounters:   06/25/25 54.4 kg (120 lb)   06/18/25 54.4 kg (120 lb)   01/13/25 54.9 kg (121 lb)       There is no height or weight on file to calculate BMI.    There were no vitals filed for this visit.  Vital signs reviewed.   General Appearance:    Alert, cooperative, in no acute distress                    Ortho exam    Physical exam of the left shoulder reveals no overlying skin changes no lymphedema no lymphadenopathy.  Patient has active flexion 180 with mild symptoms abduction is similar external rotation is to 50 and internal rotation to the upper lumbar spine with mild symptoms.  Patient has good rotator cuff  strength 4+ over 5 with isometric strength testing with pain.  Patient has a positive impingement and a positive Mar sign.  Patient has good cervical range of motion which is full and asymptomatic no radicular symptoms.  Patient has a normal elbow exam.  Good distal pulses are presentPatient has pain with overhead activity and a positive Neer sign and a positive empty can sign  They have a positive drop arm any definitive painful arc       I did review previous x-rays which are normal      Assessment:  left shoulder pain I have not seen her for 8 months at that point we thought she had some impingement we injected her she did physical therapy and did well she has had return of her symptoms this is probably an acute on chronic condition with her Down syndrome she does have some hypermobility and some weakness and I suspect she will have intermittent problems we talked about the importance of getting on a good program and making that a part of her life she remains very active but adding this to her activities      Plan: Plan is as above if we find this continues to be a recurrent issue might consider other means of testing  Follow up as indicated.  Ice, elevate, and rest as needed.  Discussed conservative measures of pain control including ice, bracing.   Large Joint Arthrocentesis: L subacromial bursa  Date/Time: 7/15/2025 2:41 PM  Consent given by: patient  Site marked: site marked  Timeout: Immediately prior to procedure a time out was called to verify the correct patient, procedure, equipment, support staff and site/side marked as required   Supporting Documentation  Indications: pain   Procedure Details  Location: shoulder - L subacromial bursa  Preparation: Patient was prepped and draped in the usual sterile fashion  Needle gauge: 21G.  Approach: posterior  Medications administered: 80 mg methylPREDNISolone acetate 80 MG/ML; 2 mL lidocaine PF 1% 1 %  Patient tolerance: patient tolerated the procedure well  with no immediate complications      Kim Lee M.D.

## 2025-07-15 ENCOUNTER — OFFICE VISIT (OUTPATIENT)
Dept: ORTHOPEDIC SURGERY | Facility: CLINIC | Age: 25
End: 2025-07-15
Payer: MEDICARE

## 2025-07-15 VITALS — BODY MASS INDEX: 27.54 KG/M2 | TEMPERATURE: 98.6 F | WEIGHT: 119 LBS | HEIGHT: 55 IN

## 2025-07-15 DIAGNOSIS — M75.42 IMPINGEMENT SYNDROME OF LEFT SHOULDER: ICD-10-CM

## 2025-07-15 DIAGNOSIS — R52 PAIN: Primary | ICD-10-CM

## 2025-07-15 PROCEDURE — 1160F RVW MEDS BY RX/DR IN RCRD: CPT | Performed by: ORTHOPAEDIC SURGERY

## 2025-07-15 PROCEDURE — 1159F MED LIST DOCD IN RCRD: CPT | Performed by: ORTHOPAEDIC SURGERY

## 2025-07-15 PROCEDURE — 73030 X-RAY EXAM OF SHOULDER: CPT | Performed by: ORTHOPAEDIC SURGERY

## 2025-07-15 PROCEDURE — 99213 OFFICE O/P EST LOW 20 MIN: CPT | Performed by: ORTHOPAEDIC SURGERY

## 2025-07-15 PROCEDURE — 20610 DRAIN/INJ JOINT/BURSA W/O US: CPT | Performed by: ORTHOPAEDIC SURGERY

## 2025-07-15 RX ORDER — METHYLPREDNISOLONE ACETATE 80 MG/ML
80 INJECTION, SUSPENSION INTRA-ARTICULAR; INTRALESIONAL; INTRAMUSCULAR; SOFT TISSUE
Status: COMPLETED | OUTPATIENT
Start: 2025-07-15 | End: 2025-07-15

## 2025-07-15 RX ORDER — LIDOCAINE HYDROCHLORIDE 10 MG/ML
2 INJECTION, SOLUTION EPIDURAL; INFILTRATION; INTRACAUDAL; PERINEURAL
Status: COMPLETED | OUTPATIENT
Start: 2025-07-15 | End: 2025-07-15

## 2025-07-15 RX ADMIN — LIDOCAINE HYDROCHLORIDE 2 ML: 10 INJECTION, SOLUTION EPIDURAL; INFILTRATION; INTRACAUDAL; PERINEURAL at 14:41

## 2025-07-15 RX ADMIN — METHYLPREDNISOLONE ACETATE 80 MG: 80 INJECTION, SUSPENSION INTRA-ARTICULAR; INTRALESIONAL; INTRAMUSCULAR; SOFT TISSUE at 14:41
